# Patient Record
Sex: MALE | Race: WHITE | NOT HISPANIC OR LATINO | Employment: PART TIME | URBAN - METROPOLITAN AREA
[De-identification: names, ages, dates, MRNs, and addresses within clinical notes are randomized per-mention and may not be internally consistent; named-entity substitution may affect disease eponyms.]

---

## 2017-01-30 ENCOUNTER — GENERIC CONVERSION - ENCOUNTER (OUTPATIENT)
Dept: OTHER | Facility: OTHER | Age: 67
End: 2017-01-30

## 2017-02-20 ENCOUNTER — ALLSCRIPTS OFFICE VISIT (OUTPATIENT)
Dept: OTHER | Facility: OTHER | Age: 67
End: 2017-02-20

## 2017-02-20 LAB — OCCULT BLD, FECAL IMMUNOLOGICAL (HISTORICAL): NEGATIVE

## 2017-05-22 ENCOUNTER — ALLSCRIPTS OFFICE VISIT (OUTPATIENT)
Dept: OTHER | Facility: OTHER | Age: 67
End: 2017-05-22

## 2018-01-12 NOTE — MISCELLANEOUS
Message  I spoke topatient re labs sugar still inc lfts slight inc chol better will katelyn hga1c and lfts in 3 mons      Signatures   Electronically signed by : SIMEON Stevenson ; Jan 30 2017 11:01AM EST                       (Author)

## 2018-01-14 VITALS
OXYGEN SATURATION: 97 % | DIASTOLIC BLOOD PRESSURE: 78 MMHG | SYSTOLIC BLOOD PRESSURE: 110 MMHG | WEIGHT: 192 LBS | BODY MASS INDEX: 26.88 KG/M2 | HEART RATE: 64 BPM | RESPIRATION RATE: 18 BRPM | HEIGHT: 71 IN

## 2018-01-14 VITALS
SYSTOLIC BLOOD PRESSURE: 118 MMHG | RESPIRATION RATE: 16 BRPM | TEMPERATURE: 95.7 F | BODY MASS INDEX: 28 KG/M2 | HEIGHT: 71 IN | DIASTOLIC BLOOD PRESSURE: 74 MMHG | WEIGHT: 200 LBS | HEART RATE: 82 BPM

## 2018-02-08 ENCOUNTER — TELEPHONE (OUTPATIENT)
Dept: FAMILY MEDICINE CLINIC | Facility: CLINIC | Age: 68
End: 2018-02-08

## 2018-02-08 NOTE — TELEPHONE ENCOUNTER
Dr Lam Washington ON 2/26/18  HE WOULD LIKE BW ORDERS SO HE CAN HAVE BW DONE BEFORE APPT  CALL HIM WHEN READY

## 2018-02-16 ENCOUNTER — TELEPHONE (OUTPATIENT)
Dept: FAMILY MEDICINE CLINIC | Facility: CLINIC | Age: 68
End: 2018-02-16

## 2018-02-20 DIAGNOSIS — E11.9 TYPE 2 DIABETES MELLITUS WITHOUT COMPLICATION, WITHOUT LONG-TERM CURRENT USE OF INSULIN (HCC): Primary | ICD-10-CM

## 2018-02-21 DIAGNOSIS — I10 ESSENTIAL HYPERTENSION: Primary | ICD-10-CM

## 2018-02-22 ENCOUNTER — TRANSCRIBE ORDERS (OUTPATIENT)
Dept: ADMINISTRATIVE | Facility: HOSPITAL | Age: 68
End: 2018-02-22

## 2018-02-22 ENCOUNTER — APPOINTMENT (OUTPATIENT)
Dept: LAB | Facility: HOSPITAL | Age: 68
End: 2018-02-22
Attending: FAMILY MEDICINE
Payer: MEDICARE

## 2018-02-22 DIAGNOSIS — E11.9 TYPE 2 DIABETES MELLITUS WITHOUT COMPLICATION, WITHOUT LONG-TERM CURRENT USE OF INSULIN (HCC): ICD-10-CM

## 2018-02-22 LAB
EST. AVERAGE GLUCOSE BLD GHB EST-MCNC: 137 MG/DL
HBA1C MFR BLD: 6.4 % (ref 4.2–6.3)

## 2018-02-22 PROCEDURE — 83036 HEMOGLOBIN GLYCOSYLATED A1C: CPT

## 2018-02-22 PROCEDURE — 36415 COLL VENOUS BLD VENIPUNCTURE: CPT

## 2018-02-23 RX ORDER — LISINOPRIL 40 MG/1
TABLET ORAL
Qty: 90 TABLET | Refills: 3 | Status: SHIPPED | OUTPATIENT
Start: 2018-02-23 | End: 2018-03-09 | Stop reason: SDUPTHER

## 2018-02-26 ENCOUNTER — OFFICE VISIT (OUTPATIENT)
Dept: FAMILY MEDICINE CLINIC | Facility: CLINIC | Age: 68
End: 2018-02-26
Payer: MEDICARE

## 2018-02-26 VITALS
HEART RATE: 88 BPM | HEIGHT: 71 IN | DIASTOLIC BLOOD PRESSURE: 80 MMHG | RESPIRATION RATE: 18 BRPM | OXYGEN SATURATION: 96 % | TEMPERATURE: 97.4 F | SYSTOLIC BLOOD PRESSURE: 116 MMHG | WEIGHT: 209 LBS | BODY MASS INDEX: 29.26 KG/M2

## 2018-02-26 DIAGNOSIS — J43.8 OTHER EMPHYSEMA (HCC): Primary | ICD-10-CM

## 2018-02-26 DIAGNOSIS — Z00.00 HEALTHCARE MAINTENANCE: ICD-10-CM

## 2018-02-26 PROCEDURE — G0438 PPPS, INITIAL VISIT: HCPCS | Performed by: FAMILY MEDICINE

## 2018-02-26 RX ORDER — ALBUTEROL SULFATE 90 UG/1
2 AEROSOL, METERED RESPIRATORY (INHALATION) EVERY 6 HOURS PRN
Qty: 1 INHALER | Refills: 5 | Status: SHIPPED | OUTPATIENT
Start: 2018-02-26 | End: 2018-12-17 | Stop reason: SDUPTHER

## 2018-02-26 NOTE — PROGRESS NOTES
Patient seen for aww    Has hx  Restrictive lung dis   But Needs inhaler  So needs refill   Alhaving  Difficulty so having vivid   Dreams  Having some ibs   Sx  will  Need f/u  Has not seen gi or had a colonoscopy

## 2018-02-27 PROBLEM — N52.9 ERECTILE DYSFUNCTION: Status: ACTIVE | Noted: 2017-02-20

## 2018-02-27 PROBLEM — Z00.00 HEALTHCARE MAINTENANCE: Status: ACTIVE | Noted: 2018-02-27

## 2018-03-09 DIAGNOSIS — I10 ESSENTIAL HYPERTENSION: ICD-10-CM

## 2018-03-09 DIAGNOSIS — F41.9 ANXIETY: Primary | ICD-10-CM

## 2018-03-09 RX ORDER — LISINOPRIL 40 MG/1
TABLET ORAL
Qty: 90 TABLET | Refills: 3 | Status: SHIPPED | OUTPATIENT
Start: 2018-03-09 | End: 2018-03-10 | Stop reason: SDUPTHER

## 2018-03-09 RX ORDER — VENLAFAXINE 75 MG/1
TABLET ORAL
Qty: 180 TABLET | Refills: 3 | Status: SHIPPED | OUTPATIENT
Start: 2018-03-09 | End: 2018-03-10 | Stop reason: SDUPTHER

## 2018-03-10 DIAGNOSIS — I10 ESSENTIAL HYPERTENSION: ICD-10-CM

## 2018-03-10 DIAGNOSIS — F41.9 ANXIETY: ICD-10-CM

## 2018-03-12 RX ORDER — VENLAFAXINE 75 MG/1
TABLET ORAL
Qty: 180 TABLET | Refills: 3 | Status: SHIPPED | OUTPATIENT
Start: 2018-03-12 | End: 2019-12-03 | Stop reason: SDUPTHER

## 2018-03-12 RX ORDER — LISINOPRIL 40 MG/1
TABLET ORAL
Qty: 90 TABLET | Refills: 3 | Status: SHIPPED | OUTPATIENT
Start: 2018-03-12 | End: 2019-05-21 | Stop reason: SDUPTHER

## 2018-03-16 ENCOUNTER — OFFICE VISIT (OUTPATIENT)
Dept: PULMONOLOGY | Facility: MEDICAL CENTER | Age: 68
End: 2018-03-16
Payer: MEDICARE

## 2018-03-16 VITALS
OXYGEN SATURATION: 96 % | HEIGHT: 71 IN | DIASTOLIC BLOOD PRESSURE: 78 MMHG | SYSTOLIC BLOOD PRESSURE: 118 MMHG | TEMPERATURE: 98 F | HEART RATE: 88 BPM | RESPIRATION RATE: 18 BRPM | BODY MASS INDEX: 29.68 KG/M2 | WEIGHT: 212 LBS

## 2018-03-16 DIAGNOSIS — R53.82 CHRONIC FATIGUE: ICD-10-CM

## 2018-03-16 DIAGNOSIS — R06.02 SHORTNESS OF BREATH: Primary | ICD-10-CM

## 2018-03-16 DIAGNOSIS — J45.20 MILD INTERMITTENT ASTHMA WITHOUT COMPLICATION: ICD-10-CM

## 2018-03-16 DIAGNOSIS — J43.8 OTHER EMPHYSEMA (HCC): ICD-10-CM

## 2018-03-16 DIAGNOSIS — R06.83 SNORING: ICD-10-CM

## 2018-03-16 DIAGNOSIS — K21.9 GASTROESOPHAGEAL REFLUX DISEASE, ESOPHAGITIS PRESENCE NOT SPECIFIED: ICD-10-CM

## 2018-03-16 DIAGNOSIS — R09.82 ALLERGIC RHINITIS WITH POSTNASAL DRIP: ICD-10-CM

## 2018-03-16 DIAGNOSIS — J30.9 ALLERGIC RHINITIS WITH POSTNASAL DRIP: ICD-10-CM

## 2018-03-16 PROCEDURE — 99205 OFFICE O/P NEW HI 60 MIN: CPT | Performed by: INTERNAL MEDICINE

## 2018-03-16 PROCEDURE — 94010 BREATHING CAPACITY TEST: CPT | Performed by: INTERNAL MEDICINE

## 2018-03-16 RX ORDER — SILDENAFIL 50 MG/1
1 TABLET, FILM COATED ORAL
COMMUNITY
Start: 2017-02-20 | End: 2019-09-03 | Stop reason: ALTCHOICE

## 2018-03-16 RX ORDER — ALPRAZOLAM 0.25 MG/1
TABLET ORAL
COMMUNITY
Start: 2014-02-07 | End: 2019-12-03 | Stop reason: ALTCHOICE

## 2018-03-16 RX ORDER — OMEPRAZOLE 20 MG/1
20 CAPSULE, DELAYED RELEASE ORAL DAILY
Qty: 30 CAPSULE | Refills: 0 | Status: SHIPPED | OUTPATIENT
Start: 2018-03-16 | End: 2018-04-09

## 2018-03-16 RX ORDER — AZELASTINE 1 MG/ML
1 SPRAY, METERED NASAL 2 TIMES DAILY
Qty: 30 ML | Refills: 0 | Status: SHIPPED | OUTPATIENT
Start: 2018-03-16 | End: 2018-04-09

## 2018-03-16 NOTE — PATIENT INSTRUCTIONS
Daily inhaler is Breo- once a day  Rinse and spit after use  Astelin nasal spray, one spray each nostril twice daily  Omeprazole- GERD medication- 30 minutes before you eat anything in the morning     Diet and behavior modification for GERD

## 2018-03-16 NOTE — LETTER
March 18, 2018     [address]  [address]    Patient: Ronald Arreola   YOB: 1950   Date of Visit: 3/16/2018       Dear Dr Ferrel Boxer: Thank you for referring Tamera Hamm to me for evaluation  Below are my notes for this consultation  If you have questions, please do not hesitate to call me  I look forward to following your patient along with you  Sincerely,        Rajesh Morales MD        CC: No Recipients  Rajesh Morales MD  3/18/2018  6:09 PM  Sign at close encounter  Assessment/Plan:       Problem List Items Addressed This Visit        Digestive    Gastroesophageal reflux disease     Patient has significant GERD which may be contributing to underlying symptoms  He will use omeprazole  Also he has difficulty swallowing and choking on thin liquids and solids  This is concerning  Will treat for GERD and assess for symptom improvement  If his symptoms do not improve he may require gastroenterology and EGD/and or other imaging studies         Relevant Medications    omeprazole (PriLOSEC) 20 mg delayed release capsule       Respiratory    Allergic rhinitis with postnasal drip     Start Astelin nasal spray  Also a contributing factor to his underlying shortness of breath and wheezing  He has other allergies  He has seen an allergist in the past   Will need to get these records  Relevant Medications    azelastine (ASTELIN) 0 1 % nasal spray    Mild intermittent asthma without complication     Start Breo daily to help with ongoing symptoms of shortness of breath and wheezing  Obtain complete PFT with methacholine challenge  Assess flow volume loops carefully on this study  Relevant Orders    CBC and differential    Bronchial challenge with methacholine       Other    Fatigue     Patient has complaints of severe fatigue which may be related to obstructive sleep apnea however will obtain thyroid function studies    He will have his blood drawn after he sees his primary care physician who will order any other necessary blood work  This is explained to him  Relevant Orders    TSH, 3rd generation with T4 reflex    Shortness of breath - Primary    Relevant Orders    POCT spirometry (Completed)    Snoring     Compound it with his symptoms of fatigue this is suspicious for underlying obstructive sleep apnea  Patient's underlying risk factors are reviewed  Underlying pathophysiology and risks of untreated sleep apnea were discussed in detail  Diagnosis via sleep study is discussed in detail with the patient today  Treatment options including CPAP, surgical options, dental device or discussed in detail  He would like to hold off on sleep testing at this time despite recommendation  Relevant Orders    Diagnostic Sleep Study      Other Visit Diagnoses     Other emphysema ([facility] [address] )        Relevant Medications    azelastine (ASTELIN) 0 1 % nasal spray            Return in about 2 weeks (around 3/30/2018)  All questions are answered to the patient's satisfaction and understanding  He verbalizes understanding  He is encouraged to call with any further questions or concerns  Portions of the record may have been created with voice recognition software  Occasional wrong word or "sound a like" substitutions may have occurred due to the inherent limitations of voice recognition software  Read the chart carefully and recognize, using context, where substitutions have occurred  ______________________________________________________________________    Chief Complaint:   Chief Complaint   Patient presents with    Shortness of Breath     Stairs,exertion, weather  Referred by Dr Marquise Abreu Cough     occasionally, loose productive clear  Patient ID: Alejandro White is a 76 y o  y o  male has a past medical history of Arthritis; Asthma; COPD (chronic obstructive pulmonary disease) ([facility] [address] ); Depression;  Hiatal hernia; Hyperlipidemia; Hypertension; Psychiatric disorder; Sinus trouble; and SOB (shortness of breath) on exertion  3/16/2018  Patient presents today for initial visit for evaluation of extreme daytime fatigue and snoring as well as shortness of breath and wheezing on exertion  Shortness of breath on exertion and wheezing  Weather related- humid  This has been going on for 25 year ago  Symptoms have not been getting worse but not getting better  +GERD- takes OTC medications- Tums, bharath seltzer  This is triggered by food  Has allergies to everything based on allergy testing by ENT/Allergist    Does choke a lot on thin liquids on solids  This has been going on for 5-6 years this is worse over the last 1 5-2 yrs  This is getting worse  Had an inhaler about 7-8 years ago  This was given to him due to bronchitis  Ventolin  He was on a pill form- ?Singulair  And he felt that this did not help him  Using Ventolin- 1-2 times per week  No nighttime awakenings to use it  No breathing trouble in his sleep  He does some difficulty when he wakes up because he has dry mouth quite severely  Slight cough some days  When he has a cold it always goes into his chest    This happens about 1-2 times per year  +post nasal drip- often- does not use any nasal spray  He does take Venlafaxine- he reviewed the side effects- has helped with mood swings  Dosing has been decreased- due to side effects  Snoring, vivid dreams  Extreme daytime fatigue  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    Travel history: no recent travel    Review of Systems   All other systems reviewed and are negative  Social history: He reports that he has never smoked  He has never used smokeless tobacco  He reports that he drinks alcohol  He reports that he does not use drugs      Past surgical history:   Past Surgical History:   Procedure Laterality Date    HERNIA REPAIR       Family history:   Family History   Problem Relation Age of Onset    Hypertension Father     Skin cancer Sister     Skin cancer Brother     Arthritis Family        Immunization History   Administered Date(s) Administered    Influenza Quadrivalent Preservative Free 3 years and older IM 10/20/2015, 11/02/2016    Influenza TIV (IM) 10/23/2007, 09/23/2011, 10/05/2014    Pneumococcal Conjugate 13-Valent 02/22/2016    Pneumococcal Polysaccharide PPV23 02/20/2017    Tdap 01/30/2007, 02/20/2017    Zoster 11/05/2014     Current Outpatient Prescriptions   Medication Sig Dispense Refill    albuterol (VENTOLIN HFA) 90 mcg/act inhaler Inhale 2 puffs every 6 (six) hours as needed for wheezing 1 Inhaler 5    amLODIPine (NORVASC) 10 mg tablet Take 5 mg by mouth daily        ibuprofen (MOTRIN) 800 mg tablet Take 1 tablet (800 mg total) by mouth every 8 (eight) hours as needed for mild pain for up to 30 doses  30 tablet 0    lisinopril (ZESTRIL) 40 mg tablet TAKE ONE TABLET BY MOUTH EVERY DAY 90 tablet 3    venlafaxine (EFFEXOR) 75 mg tablet TAKE ONE TABLET BY MOUTH TWICE A DAY WITH A MEAL (Patient taking differently: TAKE ONE TABLET BY MOUTH DAILY) 180 tablet 3    ALPRAZolam (XANAX) 0 25 mg tablet Take by mouth      ondansetron (ZOFRAN) 4 mg tablet Take 1 tablet (4 mg total) by mouth every 8 (eight) hours as needed for nausea or vomiting for up to 20 doses  20 tablet 0    oxyCODONE-acetaminophen (PERCOCET) 5-325 mg per tablet Take 1 tablet by mouth every 4 (four) hours as needed for moderate pain for up to 15 doses  Max Daily Amount: 6 tablets 15 tablet 0    sildenafil (VIAGRA) 50 MG tablet Take 1 tablet by mouth      venlafaxine (EFFEXOR-XR) 150 mg 24 hr capsule Take 150 mg by mouth daily  No current facility-administered medications for this visit  Allergies: Dust mite extract;  Other; and Oxycodone    Objective:  Vitals:    03/16/18 1009   BP: 118/78   BP Location: Right arm   Patient Position: Sitting Cuff Size: Standard   Pulse: 88   Resp: 18   Temp: 98 °F (36 7 °C)   TempSrc: Oral   SpO2: 96%   Weight: 96 2 kg (212 lb)   Height: 5' 11" (1 803 m)   Oxygen Therapy  SpO2: 96 %    Wt Readings from Last 3 Encounters:   03/16/18 96 2 kg (212 lb)   02/26/18 94 8 kg (209 lb)   05/22/17 87 1 kg (192 lb)     Body mass index is 29 57 kg/m²  Physical Exam   Constitutional: He is oriented to person, place, and time  He appears well-developed and well-nourished  No distress  HENT:   Head: Normocephalic and atraumatic  Mouth/Throat: Oropharynx is clear and moist  No oropharyngeal exudate  Mallampati 4  Erythema   Eyes: EOM are normal  Pupils are equal, round, and reactive to light  Neck: Normal range of motion  Neck supple  No tracheal deviation present  No thyromegaly present  Neck circumference 16 5   Cardiovascular: Normal rate and regular rhythm  No murmur heard  Pulmonary/Chest: Effort normal and breath sounds normal  No respiratory distress  He has no wheezes  He has no rales  He exhibits no tenderness  Abdominal: Soft  Bowel sounds are normal  He exhibits no distension  There is no tenderness  Musculoskeletal: Normal range of motion  He exhibits no edema  Lymphadenopathy:     He has no cervical adenopathy  Neurological: He is alert and oriented to person, place, and time  No cranial nerve deficit  Skin: Skin is warm and dry  He is not diaphoretic  Psychiatric: He has a normal mood and affect  His behavior is normal    Vitals reviewed  Lab Review:   Appointment on 02/22/2018   Component Date Value    Hemoglobin A1C 02/22/2018 6 4*    EAG 02/22/2018 137        Diagnostics:    Office Spirometry Results:  Spirometry performed at today's visit showed normal obstructive ratio and normal FVC and FEV1  However there is some flattening of the inspiratory flow volume loop which may be consistent with extrathoracic obstruction

## 2018-03-16 NOTE — PROGRESS NOTES
Assessment/Plan:       Problem List Items Addressed This Visit        Digestive    Gastroesophageal reflux disease     Patient has significant GERD which may be contributing to underlying symptoms  He will use omeprazole  Also he has difficulty swallowing and choking on thin liquids and solids  This is concerning  Will treat for GERD and assess for symptom improvement  If his symptoms do not improve he may require gastroenterology and EGD/and or other imaging studies         Relevant Medications    omeprazole (PriLOSEC) 20 mg delayed release capsule       Respiratory    Allergic rhinitis with postnasal drip     Start Astelin nasal spray  Also a contributing factor to his underlying shortness of breath and wheezing  He has other allergies  He has seen an allergist in the past   Will need to get these records  Relevant Medications    azelastine (ASTELIN) 0 1 % nasal spray    Mild intermittent asthma without complication     Start Breo daily to help with ongoing symptoms of shortness of breath and wheezing  Obtain complete PFT with methacholine challenge  Assess flow volume loops carefully on this study  Relevant Orders    CBC and differential    Bronchial challenge with methacholine       Other    Fatigue     Patient has complaints of severe fatigue which may be related to obstructive sleep apnea however will obtain thyroid function studies  He will have his blood drawn after he sees his primary care physician who will order any other necessary blood work  This is explained to him  Relevant Orders    TSH, 3rd generation with T4 reflex    Shortness of breath - Primary    Relevant Orders    POCT spirometry (Completed)    Snoring     Compound it with his symptoms of fatigue this is suspicious for underlying obstructive sleep apnea  Patient's underlying risk factors are reviewed  Underlying pathophysiology and risks of untreated sleep apnea were discussed in detail    Diagnosis via sleep study is discussed in detail with the patient today  Treatment options including CPAP, surgical options, dental device or discussed in detail  He would like to hold off on sleep testing at this time despite recommendation  Relevant Orders    Diagnostic Sleep Study      Other Visit Diagnoses     Other emphysema ([facility] [address] )        Relevant Medications    azelastine (ASTELIN) 0 1 % nasal spray            Return in about 2 weeks (around 3/30/2018)  All questions are answered to the patient's satisfaction and understanding  He verbalizes understanding  He is encouraged to call with any further questions or concerns  Portions of the record may have been created with voice recognition software  Occasional wrong word or "sound a like" substitutions may have occurred due to the inherent limitations of voice recognition software  Read the chart carefully and recognize, using context, where substitutions have occurred  ______________________________________________________________________    Chief Complaint:   Chief Complaint   Patient presents with    Shortness of Breath     Stairs,exertion, weather  Referred by Dr Tyree Camacho Cough     occasionally, loose productive clear  Patient ID: Mely Aparicio is a 76 y o  y o  male has a past medical history of Arthritis; Asthma; COPD (chronic obstructive pulmonary disease) ([facility] [address] ); Depression; Hiatal hernia; Hyperlipidemia; Hypertension; Psychiatric disorder; Sinus trouble; and SOB (shortness of breath) on exertion  3/16/2018  Patient presents today for initial visit for evaluation of extreme daytime fatigue and snoring as well as shortness of breath and wheezing on exertion  Shortness of breath on exertion and wheezing  Weather related- humid  This has been going on for 25 year ago  Symptoms have not been getting worse but not getting better  +GERD- takes OTC medications- Tums, bharath seltzer  This is triggered by food      Has allergies to everything based on allergy testing by ENT/Allergist    Does choke a lot on thin liquids on solids  This has been going on for 5-6 years this is worse over the last 1 5-2 yrs  This is getting worse  Had an inhaler about 7-8 years ago  This was given to him due to bronchitis  Ventolin  He was on a pill form- ?Singulair  And he felt that this did not help him  Using Ventolin- 1-2 times per week  No nighttime awakenings to use it  No breathing trouble in his sleep  He does some difficulty when he wakes up because he has dry mouth quite severely  Slight cough some days  When he has a cold it always goes into his chest    This happens about 1-2 times per year  +post nasal drip- often- does not use any nasal spray  He does take Venlafaxine- he reviewed the side effects- has helped with mood swings  Dosing has been decreased- due to side effects  Snoring, vivid dreams  Extreme daytime fatigue  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    Travel history: no recent travel    Review of Systems   All other systems reviewed and are negative  Social history: He reports that he has never smoked  He has never used smokeless tobacco  He reports that he drinks alcohol  He reports that he does not use drugs      Past surgical history:   Past Surgical History:   Procedure Laterality Date    HERNIA REPAIR       Family history:   Family History   Problem Relation Age of Onset    Hypertension Father     Skin cancer Sister     Skin cancer Brother     Arthritis Family        Immunization History   Administered Date(s) Administered    Influenza Quadrivalent Preservative Free 3 years and older IM 10/20/2015, 11/02/2016    Influenza TIV (IM) 10/23/2007, 09/23/2011, 10/05/2014    Pneumococcal Conjugate 13-Valent 02/22/2016    Pneumococcal Polysaccharide PPV23 02/20/2017    Tdap 01/30/2007, 02/20/2017    Zoster 11/05/2014     Current Outpatient Prescriptions   Medication Sig Dispense Refill    albuterol (VENTOLIN HFA) 90 mcg/act inhaler Inhale 2 puffs every 6 (six) hours as needed for wheezing 1 Inhaler 5    amLODIPine (NORVASC) 10 mg tablet Take 5 mg by mouth daily        ibuprofen (MOTRIN) 800 mg tablet Take 1 tablet (800 mg total) by mouth every 8 (eight) hours as needed for mild pain for up to 30 doses  30 tablet 0    lisinopril (ZESTRIL) 40 mg tablet TAKE ONE TABLET BY MOUTH EVERY DAY 90 tablet 3    venlafaxine (EFFEXOR) 75 mg tablet TAKE ONE TABLET BY MOUTH TWICE A DAY WITH A MEAL (Patient taking differently: TAKE ONE TABLET BY MOUTH DAILY) 180 tablet 3    ALPRAZolam (XANAX) 0 25 mg tablet Take by mouth      ondansetron (ZOFRAN) 4 mg tablet Take 1 tablet (4 mg total) by mouth every 8 (eight) hours as needed for nausea or vomiting for up to 20 doses  20 tablet 0    oxyCODONE-acetaminophen (PERCOCET) 5-325 mg per tablet Take 1 tablet by mouth every 4 (four) hours as needed for moderate pain for up to 15 doses  Max Daily Amount: 6 tablets 15 tablet 0    sildenafil (VIAGRA) 50 MG tablet Take 1 tablet by mouth      venlafaxine (EFFEXOR-XR) 150 mg 24 hr capsule Take 150 mg by mouth daily  No current facility-administered medications for this visit  Allergies: Dust mite extract; Other; and Oxycodone    Objective:  Vitals:    03/16/18 1009   BP: 118/78   BP Location: Right arm   Patient Position: Sitting   Cuff Size: Standard   Pulse: 88   Resp: 18   Temp: 98 °F (36 7 °C)   TempSrc: Oral   SpO2: 96%   Weight: 96 2 kg (212 lb)   Height: 5' 11" (1 803 m)   Oxygen Therapy  SpO2: 96 %    Wt Readings from Last 3 Encounters:   03/16/18 96 2 kg (212 lb)   02/26/18 94 8 kg (209 lb)   05/22/17 87 1 kg (192 lb)     Body mass index is 29 57 kg/m²  Physical Exam   Constitutional: He is oriented to person, place, and time  He appears well-developed and well-nourished  No distress  HENT:   Head: Normocephalic and atraumatic     Mouth/Throat: Oropharynx is clear and moist  No oropharyngeal exudate  Mallampati 4  Erythema   Eyes: EOM are normal  Pupils are equal, round, and reactive to light  Neck: Normal range of motion  Neck supple  No tracheal deviation present  No thyromegaly present  Neck circumference 16 5   Cardiovascular: Normal rate and regular rhythm  No murmur heard  Pulmonary/Chest: Effort normal and breath sounds normal  No respiratory distress  He has no wheezes  He has no rales  He exhibits no tenderness  Abdominal: Soft  Bowel sounds are normal  He exhibits no distension  There is no tenderness  Musculoskeletal: Normal range of motion  He exhibits no edema  Lymphadenopathy:     He has no cervical adenopathy  Neurological: He is alert and oriented to person, place, and time  No cranial nerve deficit  Skin: Skin is warm and dry  He is not diaphoretic  Psychiatric: He has a normal mood and affect  His behavior is normal    Vitals reviewed  Lab Review:   Appointment on 02/22/2018   Component Date Value    Hemoglobin A1C 02/22/2018 6 4*    EAG 02/22/2018 137        Diagnostics:    Office Spirometry Results:  Spirometry performed at today's visit showed normal obstructive ratio and normal FVC and FEV1  However there is some flattening of the inspiratory flow volume loop which may be consistent with extrathoracic obstruction

## 2018-03-18 PROBLEM — J45.20 MILD INTERMITTENT ASTHMA WITHOUT COMPLICATION: Status: ACTIVE | Noted: 2018-03-18

## 2018-03-18 PROBLEM — R06.02 SHORTNESS OF BREATH: Status: ACTIVE | Noted: 2018-03-18

## 2018-03-18 PROBLEM — J30.9 ALLERGIC RHINITIS WITH POSTNASAL DRIP: Status: ACTIVE | Noted: 2018-03-18

## 2018-03-18 PROBLEM — R06.83 SNORING: Status: ACTIVE | Noted: 2018-03-18

## 2018-03-18 PROBLEM — R09.82 ALLERGIC RHINITIS WITH POSTNASAL DRIP: Status: ACTIVE | Noted: 2018-03-18

## 2018-03-18 PROBLEM — K21.9 GASTROESOPHAGEAL REFLUX DISEASE: Status: ACTIVE | Noted: 2018-03-18

## 2018-03-18 NOTE — ASSESSMENT & PLAN NOTE
Start Astelin nasal spray  Also a contributing factor to his underlying shortness of breath and wheezing  He has other allergies  He has seen an allergist in the past   Will need to get these records

## 2018-03-18 NOTE — ASSESSMENT & PLAN NOTE
Compound it with his symptoms of fatigue this is suspicious for underlying obstructive sleep apnea  Patient's underlying risk factors are reviewed  Underlying pathophysiology and risks of untreated sleep apnea were discussed in detail  Diagnosis via sleep study is discussed in detail with the patient today  Treatment options including CPAP, surgical options, dental device or discussed in detail  He would like to hold off on sleep testing at this time despite recommendation

## 2018-03-18 NOTE — ASSESSMENT & PLAN NOTE
Start Breo daily to help with ongoing symptoms of shortness of breath and wheezing  Obtain complete PFT with methacholine challenge  Assess flow volume loops carefully on this study

## 2018-03-18 NOTE — ASSESSMENT & PLAN NOTE
Patient has significant GERD which may be contributing to underlying symptoms  He will use omeprazole  Also he has difficulty swallowing and choking on thin liquids and solids  This is concerning  Will treat for GERD and assess for symptom improvement    If his symptoms do not improve he may require gastroenterology and EGD/and or other imaging studies

## 2018-03-18 NOTE — ASSESSMENT & PLAN NOTE
Patient has complaints of severe fatigue which may be related to obstructive sleep apnea however will obtain thyroid function studies  He will have his blood drawn after he sees his primary care physician who will order any other necessary blood work  This is explained to him

## 2018-03-27 ENCOUNTER — APPOINTMENT (OUTPATIENT)
Dept: LAB | Facility: HOSPITAL | Age: 68
End: 2018-03-27
Attending: INTERNAL MEDICINE
Payer: MEDICARE

## 2018-03-27 ENCOUNTER — HOSPITAL ENCOUNTER (OUTPATIENT)
Dept: PULMONOLOGY | Facility: HOSPITAL | Age: 68
Discharge: HOME | End: 2018-03-27
Attending: INTERNAL MEDICINE
Payer: MEDICARE

## 2018-03-27 ENCOUNTER — TRANSCRIBE ORDERS (OUTPATIENT)
Dept: ADMINISTRATIVE | Facility: HOSPITAL | Age: 68
End: 2018-03-27

## 2018-03-27 DIAGNOSIS — J45.20 MILD INTERMITTENT ASTHMA WITHOUT COMPLICATION: ICD-10-CM

## 2018-03-27 DIAGNOSIS — R53.82 CHRONIC FATIGUE: ICD-10-CM

## 2018-03-27 LAB
BASOPHILS # BLD AUTO: 0.1 THOUSANDS/ΜL (ref 0–0.1)
BASOPHILS NFR BLD AUTO: 1 % (ref 0–1)
EOSINOPHIL # BLD AUTO: 0.1 THOUSAND/ΜL (ref 0–0.61)
EOSINOPHIL NFR BLD AUTO: 1 % (ref 0–6)
ERYTHROCYTE [DISTWIDTH] IN BLOOD BY AUTOMATED COUNT: 12.6 % (ref 11.6–15.1)
HCT VFR BLD AUTO: 48.4 % (ref 42–52)
HGB BLD-MCNC: 16 G/DL (ref 14–18)
LYMPHOCYTES # BLD AUTO: 1.5 THOUSANDS/ΜL (ref 0.6–4.47)
LYMPHOCYTES NFR BLD AUTO: 22 % (ref 14–44)
MCH RBC QN AUTO: 31.4 PG (ref 27–31)
MCHC RBC AUTO-ENTMCNC: 33 G/DL (ref 31.4–37.4)
MCV RBC AUTO: 95 FL (ref 82–98)
MONOCYTES # BLD AUTO: 0.8 THOUSAND/ΜL (ref 0.17–1.22)
MONOCYTES NFR BLD AUTO: 11 % (ref 4–12)
NEUTROPHILS # BLD AUTO: 4.6 THOUSANDS/ΜL (ref 1.85–7.62)
NEUTS SEG NFR BLD AUTO: 66 % (ref 43–75)
PLATELET # BLD AUTO: 351 THOUSANDS/UL (ref 130–400)
PMV BLD AUTO: 8.2 FL (ref 8.9–12.7)
RBC # BLD AUTO: 5.09 MILLION/UL (ref 4.7–6.1)
TSH SERPL DL<=0.05 MIU/L-ACNC: 2.3 UIU/ML (ref 0.36–3.74)
WBC # BLD AUTO: 7.1 THOUSAND/UL (ref 4.8–10.8)

## 2018-03-27 PROCEDURE — 94760 N-INVAS EAR/PLS OXIMETRY 1: CPT

## 2018-03-27 PROCEDURE — 36415 COLL VENOUS BLD VENIPUNCTURE: CPT

## 2018-03-27 PROCEDURE — 94070 EVALUATION OF WHEEZING: CPT

## 2018-03-27 PROCEDURE — 85025 COMPLETE CBC W/AUTO DIFF WBC: CPT

## 2018-03-27 PROCEDURE — 94729 DIFFUSING CAPACITY: CPT

## 2018-03-27 PROCEDURE — 95806 SLEEP STUDY UNATT&RESP EFFT: CPT | Performed by: INTERNAL MEDICINE

## 2018-03-27 PROCEDURE — 94726 PLETHYSMOGRAPHY LUNG VOLUMES: CPT

## 2018-03-27 PROCEDURE — 84443 ASSAY THYROID STIM HORMONE: CPT

## 2018-03-27 RX ORDER — ALBUTEROL SULFATE 2.5 MG/3ML
2.5 SOLUTION RESPIRATORY (INHALATION) ONCE AS NEEDED
Status: DISCONTINUED | OUTPATIENT
Start: 2018-03-27 | End: 2018-03-31 | Stop reason: HOSPADM

## 2018-03-27 RX ORDER — ALBUTEROL SULFATE 2.5 MG/3ML
2.5 SOLUTION RESPIRATORY (INHALATION) ONCE
Status: DISCONTINUED | OUTPATIENT
Start: 2018-03-27 | End: 2018-03-31 | Stop reason: HOSPADM

## 2018-04-02 ENCOUNTER — TELEPHONE (OUTPATIENT)
Dept: PULMONOLOGY | Facility: MEDICAL CENTER | Age: 68
End: 2018-04-02

## 2018-04-02 DIAGNOSIS — J44.9 COPD (CHRONIC OBSTRUCTIVE PULMONARY DISEASE) WITH CHRONIC BRONCHITIS (HCC): Primary | ICD-10-CM

## 2018-04-02 RX ORDER — FLUTICASONE FUROATE AND VILANTEROL 100; 25 UG/1; UG/1
1 POWDER RESPIRATORY (INHALATION) DAILY
Qty: 1 EACH | Refills: 0 | Status: SHIPPED | COMMUNITY
Start: 2018-04-02 | End: 2018-04-09

## 2018-04-02 RX ORDER — FLUTICASONE FUROATE AND VILANTEROL 100; 25 UG/1; UG/1
1 POWDER RESPIRATORY (INHALATION)
Qty: 1 EACH | Refills: 0 | Status: SHIPPED | OUTPATIENT
Start: 2018-04-02 | End: 2018-04-02 | Stop reason: SDUPTHER

## 2018-04-02 RX ORDER — FLUTICASONE FUROATE AND VILANTEROL 100; 25 UG/1; UG/1
1 POWDER RESPIRATORY (INHALATION)
Qty: 1 EACH | Refills: 0 | Status: SHIPPED | OUTPATIENT
Start: 2018-04-02 | End: 2018-04-09

## 2018-04-02 NOTE — TELEPHONE ENCOUNTER
The patient came into the office to get a sample of Breo-  He was seen 2 weeks ago and was given a sample, but does not have another one to last until he is seen again next week-    No rx was sent   Was just given a sample--   Can you authorize a sample be given so he can have the medication until he is seen again

## 2018-04-09 ENCOUNTER — OFFICE VISIT (OUTPATIENT)
Dept: PULMONOLOGY | Facility: MEDICAL CENTER | Age: 68
End: 2018-04-09
Payer: MEDICARE

## 2018-04-09 VITALS
SYSTOLIC BLOOD PRESSURE: 120 MMHG | HEIGHT: 71 IN | DIASTOLIC BLOOD PRESSURE: 84 MMHG | TEMPERATURE: 98.2 F | HEART RATE: 86 BPM | OXYGEN SATURATION: 96 % | RESPIRATION RATE: 12 BRPM | BODY MASS INDEX: 29.82 KG/M2 | WEIGHT: 213 LBS

## 2018-04-09 DIAGNOSIS — J44.9 COPD (CHRONIC OBSTRUCTIVE PULMONARY DISEASE) WITH CHRONIC BRONCHITIS (HCC): ICD-10-CM

## 2018-04-09 DIAGNOSIS — J45.20 MILD INTERMITTENT ASTHMA WITHOUT COMPLICATION: Primary | ICD-10-CM

## 2018-04-09 PROCEDURE — 99213 OFFICE O/P EST LOW 20 MIN: CPT | Performed by: INTERNAL MEDICINE

## 2018-04-09 RX ORDER — FLUTICASONE FUROATE AND VILANTEROL 100; 25 UG/1; UG/1
1 POWDER RESPIRATORY (INHALATION) DAILY
Qty: 1 EACH | Refills: 0 | Status: SHIPPED | OUTPATIENT
Start: 2018-04-09 | End: 2018-12-17

## 2018-04-09 RX ORDER — FLUTICASONE FUROATE AND VILANTEROL 100; 25 UG/1; UG/1
1 POWDER RESPIRATORY (INHALATION) DAILY
Qty: 1 EACH | Refills: 6 | Status: SHIPPED | OUTPATIENT
Start: 2018-04-09 | End: 2018-04-09 | Stop reason: SDUPTHER

## 2018-04-09 NOTE — PROGRESS NOTES
Assessment/Plan:     Problem List Items Addressed This Visit        Respiratory    Mild intermittent asthma without complication - Primary    Relevant Medications    fluticasone furoate-vilanterol (BREO ELLIPTA)      Other Visit Diagnoses     COPD (chronic obstructive pulmonary disease) with chronic bronchitis (HCC)        Relevant Medications    fluticasone furoate-vilanterol (BREO ELLIPTA)          Patient is overall improved  No evidence of COPD  Continue with Breo  Return in about 2 months (around 6/9/2018)  All questions are answered to the patient's satisfaction and understanding  He verbalizes understanding  He is encouraged to call with any further questions or concerns  Portions of the record may have been created with voice recognition software  Occasional wrong word or "sound a like" substitutions may have occurred due to the inherent limitations of voice recognition software  Read the chart carefully and recognize, using context, where substitutions have occurred  ______________________________________________________________________    Chief Complaint: No chief complaint on file  Patient ID: Luciana Flores is a 76 y o  y o  male has a past medical history of Arthritis; Asthma; COPD (chronic obstructive pulmonary disease) ([address] ); Depression; Hiatal hernia; Hyperlipidemia; Hypertension; Psychiatric disorder; Sinus trouble; and SOB (shortness of breath) on exertion  4/9/2018  Breo is really helping him  Feels better  No further choking on food  GERD is improved with diet modification    Fatigue is still there  But this is also improved  No further postnasal drip per congestion  Review of Systems   All other systems reviewed and are negative  Smoking history: He reports that he has never smoked   He has never used smokeless tobacco     The following portions of the patient's history were reviewed and updated as appropriate: allergies, current medications, past family history, past medical history, past social history, past surgical history and problem list     Immunization History   Administered Date(s) Administered    Influenza Quadrivalent Preservative Free 3 years and older IM 10/20/2015, 11/02/2016    Influenza TIV (IM) 10/23/2007, 09/23/2011, 10/05/2014    Pneumococcal Conjugate 13-Valent 02/22/2016    Pneumococcal Polysaccharide PPV23 02/20/2017    Tdap 01/30/2007, 02/20/2017    Zoster 11/05/2014     Current Outpatient Prescriptions   Medication Sig Dispense Refill    albuterol (VENTOLIN HFA) 90 mcg/act inhaler Inhale 2 puffs every 6 (six) hours as needed for wheezing 1 Inhaler 5    ALPRAZolam (XANAX) 0 25 mg tablet Take by mouth      amLODIPine (NORVASC) 10 mg tablet Take 5 mg by mouth daily        azelastine (ASTELIN) 0 1 % nasal spray 1 spray into each nostril 2 (two) times a day Use in each nostril as directed 30 mL 0    fluticasone furoate-vilanterol (BREO ELLIPTA) Inhale 1 puff daily 1 each 0    ibuprofen (MOTRIN) 800 mg tablet Take 1 tablet (800 mg total) by mouth every 8 (eight) hours as needed for mild pain for up to 30 doses  30 tablet 0    lisinopril (ZESTRIL) 40 mg tablet TAKE ONE TABLET BY MOUTH EVERY DAY 90 tablet 3    omeprazole (PriLOSEC) 20 mg delayed release capsule Take 1 capsule (20 mg total) by mouth daily 30 capsule 0    venlafaxine (EFFEXOR) 75 mg tablet TAKE ONE TABLET BY MOUTH TWICE A DAY WITH A MEAL (Patient taking differently: TAKE ONE TABLET BY MOUTH DAILY) 180 tablet 3    fluticasone furoate-vilanterol (BREO ELLIPTA) Inhale 1 puff Daily at 2am 1 each 0    ondansetron (ZOFRAN) 4 mg tablet Take 1 tablet (4 mg total) by mouth every 8 (eight) hours as needed for nausea or vomiting for up to 20 doses  20 tablet 0    oxyCODONE-acetaminophen (PERCOCET) 5-325 mg per tablet Take 1 tablet by mouth every 4 (four) hours as needed for moderate pain for up to 15 doses   Max Daily Amount: 6 tablets 15 tablet 0    sildenafil (VIAGRA) 50 MG tablet Take 1 tablet by mouth      venlafaxine (EFFEXOR-XR) 150 mg 24 hr capsule Take 150 mg by mouth daily  No current facility-administered medications for this visit  Allergies: Dust mite extract; Other; and Oxycodone    Objective:  Vitals:    04/09/18 1404   BP: 120/84   BP Location: Right arm   Patient Position: Sitting   Cuff Size: Adult   Pulse: 86   Resp: 12   Temp: 98 2 °F (36 8 °C)   TempSrc: Oral   SpO2: 96%   Weight: 96 6 kg (213 lb)   Height: 5' 11" (1 803 m)   Oxygen Therapy  SpO2: 96 %    Wt Readings from Last 3 Encounters:   04/09/18 96 6 kg (213 lb)   03/16/18 96 2 kg (212 lb)   02/26/18 94 8 kg (209 lb)     Body mass index is 29 71 kg/m²  Physical Exam   Constitutional: He is oriented to person, place, and time  He appears well-developed and well-nourished  No distress  HENT:   Head: Normocephalic and atraumatic  Mouth/Throat: Oropharynx is clear and moist  No oropharyngeal exudate  Eyes: EOM are normal  Pupils are equal, round, and reactive to light  Neck: Normal range of motion  Neck supple  No tracheal deviation present  No thyromegaly present  Cardiovascular: Normal rate and regular rhythm  No murmur heard  Pulmonary/Chest: Effort normal and breath sounds normal  No respiratory distress  He has no wheezes  He has no rales  He exhibits no tenderness  Abdominal: Soft  Bowel sounds are normal  He exhibits no distension  There is no tenderness  Musculoskeletal: Normal range of motion  He exhibits no edema  Lymphadenopathy:     He has no cervical adenopathy  Neurological: He is alert and oriented to person, place, and time  No cranial nerve deficit  Skin: Skin is warm and dry  He is not diaphoretic  Psychiatric: He has a normal mood and affect  His behavior is normal    Vitals reviewed        Lab Review:   Appointment on 03/27/2018   Component Date Value    WBC 03/27/2018 7 10     RBC 03/27/2018 5 09     Hemoglobin 03/27/2018 16 0     Hematocrit 03/27/2018 48 4     MCV 03/27/2018 95     MCH 03/27/2018 31 4*    MCHC 03/27/2018 33 0     RDW 03/27/2018 12 6     MPV 03/27/2018 8 2*    Platelets 24/49/9728 351     Neutrophils Relative 03/27/2018 66     Lymphocytes Relative 03/27/2018 22     Monocytes Relative 03/27/2018 11     Eosinophils Relative 03/27/2018 1     Basophils Relative 03/27/2018 1     Neutrophils Absolute 03/27/2018 4 60     Lymphocytes Absolute 03/27/2018 1 50     Monocytes Absolute 03/27/2018 0 80     Eosinophils Absolute 03/27/2018 0 10     Basophils Absolute 03/27/2018 0 10     TSH 3RD GENERATON 03/27/2018 2 300    Appointment on 02/22/2018   Component Date Value    Hemoglobin A1C 02/22/2018 6 4*    EAG 02/22/2018 137        Diagnostics:  Complete PFT performed recently showed methacholine challenge was negative

## 2018-05-21 DIAGNOSIS — I10 ESSENTIAL HYPERTENSION: Primary | ICD-10-CM

## 2018-05-21 RX ORDER — AMLODIPINE BESYLATE 5 MG/1
TABLET ORAL
Qty: 90 TABLET | Refills: 3 | Status: SHIPPED | OUTPATIENT
Start: 2018-05-21 | End: 2019-04-30 | Stop reason: SDUPTHER

## 2018-07-17 ENCOUNTER — OFFICE VISIT (OUTPATIENT)
Dept: FAMILY MEDICINE CLINIC | Facility: CLINIC | Age: 68
End: 2018-07-17
Payer: MEDICARE

## 2018-07-17 VITALS
HEART RATE: 92 BPM | HEIGHT: 71 IN | OXYGEN SATURATION: 95 % | SYSTOLIC BLOOD PRESSURE: 102 MMHG | BODY MASS INDEX: 27.44 KG/M2 | TEMPERATURE: 98 F | DIASTOLIC BLOOD PRESSURE: 70 MMHG | WEIGHT: 196 LBS

## 2018-07-17 DIAGNOSIS — L25.9 CONTACT DERMATITIS, UNSPECIFIED CONTACT DERMATITIS TYPE, UNSPECIFIED TRIGGER: Primary | ICD-10-CM

## 2018-07-17 PROCEDURE — 99213 OFFICE O/P EST LOW 20 MIN: CPT | Performed by: NURSE PRACTITIONER

## 2018-07-17 RX ORDER — PREDNISONE 10 MG/1
10 TABLET ORAL DAILY
Qty: 15 TABLET | Refills: 0 | Status: SHIPPED | OUTPATIENT
Start: 2018-07-17 | End: 2018-12-17

## 2018-07-17 NOTE — PROGRESS NOTES
Assessment/Plan:  1  avoid sun during rash exacerbation  2  F/u if condition changes/owrsens         Diagnoses and all orders for this visit:    Contact dermatitis, unspecified contact dermatitis type, unspecified trigger  -     predniSONE 10 mg tablet; Take 1 tablet (10 mg total) by mouth daily Take 5 tabs day 1, take 4 tabs day 2, take 3 tabs day 3, take 2 tabs day 4, take 1 tab day 5          Subjective:      Patient ID: Johnathan Daly is a 76 y o  male  69-year-old male presents with rash for about a week  Reports he was gardening and his rash  Couple days later  Tried some hydrocortisone  Helped a little  Itchy  Denies fever  Not spreading  The following portions of the patient's history were reviewed and updated as appropriate: allergies and current medications  Review of Systems   Constitutional: Negative  Skin: Positive for rash  Objective:      /70   Pulse 92   Temp 98 °F (36 7 °C)   Ht 5' 11" (1 803 m)   Wt 88 9 kg (196 lb)   SpO2 95%   BMI 27 34 kg/m²          Physical Exam   Constitutional: He appears well-developed and well-nourished     Skin:   Contact dermatitis left arm approx 7cm X 3cm and left abdomen approc [id number]

## 2018-07-20 ENCOUNTER — OFFICE VISIT (OUTPATIENT)
Dept: FAMILY MEDICINE CLINIC | Facility: CLINIC | Age: 68
End: 2018-07-20
Payer: MEDICARE

## 2018-07-20 VITALS
HEIGHT: 71 IN | BODY MASS INDEX: 27.76 KG/M2 | DIASTOLIC BLOOD PRESSURE: 74 MMHG | HEART RATE: 90 BPM | TEMPERATURE: 98.1 F | RESPIRATION RATE: 18 BRPM | OXYGEN SATURATION: 95 % | SYSTOLIC BLOOD PRESSURE: 114 MMHG | WEIGHT: 198.31 LBS

## 2018-07-20 DIAGNOSIS — L25.9 CONTACT DERMATITIS, UNSPECIFIED CONTACT DERMATITIS TYPE, UNSPECIFIED TRIGGER: Primary | ICD-10-CM

## 2018-07-20 PROCEDURE — 99213 OFFICE O/P EST LOW 20 MIN: CPT | Performed by: NURSE PRACTITIONER

## 2018-07-20 RX ORDER — PREDNISONE 10 MG/1
10 TABLET ORAL DAILY
Qty: 15 TABLET | Refills: 0 | Status: SHIPPED | OUTPATIENT
Start: 2018-07-20 | End: 2018-12-17

## 2018-07-20 NOTE — PROGRESS NOTES
Assessment/Plan:  1  Protect the irritated area from the sun  2  Take medication as prescribed  3  Follow-up condition changes or worsens        Diagnoses and all orders for this visit:    Contact dermatitis, unspecified contact dermatitis type, unspecified trigger  -     predniSONE 10 mg tablet; Take 1 tablet (10 mg total) by mouth daily Take 5 tabs day 1, take 4 tabs day 2, take 3 tabs day 3, take 4 tabs day 4, take 1 tab day 5          Subjective:      Patient ID: Josefina Bills is a 76 y o  male  69-year-old male presents with rash on bilateral arms  Was seen on Tuesday and prescribed a steroid  Reports rash is getting better however it still itchy  Reports that he usually has to get it treated 2 times with oral steroid in order for the rash to go away  Reports he was gardening and came with contact with the plant  Denies fever  The following portions of the patient's history were reviewed and updated as appropriate: allergies and current medications  Review of Systems   Constitutional: Negative  Skin: Positive for rash  Objective:      /74 (BP Location: Left arm, Patient Position: Sitting)   Pulse 90   Temp 98 1 °F (36 7 °C) (Tympanic)   Resp 18   Ht 5' 11" (1 803 m)   Wt 90 kg (198 lb 5 oz)   SpO2 95%   BMI 27 66 kg/m²          Physical Exam   Constitutional: He appears well-developed and well-nourished     Skin:   Contact dermatitis bilateral arms/right arm approximately 3 centimeters x 3 centimeters and left arm approximately 2 centimeters x 1 centimeter

## 2018-08-03 DIAGNOSIS — K21.9 GASTROESOPHAGEAL REFLUX DISEASE, ESOPHAGITIS PRESENCE NOT SPECIFIED: ICD-10-CM

## 2018-08-03 RX ORDER — OMEPRAZOLE 20 MG/1
CAPSULE, DELAYED RELEASE ORAL
Qty: 30 CAPSULE | Refills: 0 | Status: SHIPPED | OUTPATIENT
Start: 2018-08-03 | End: 2018-12-17

## 2018-09-26 ENCOUNTER — TELEPHONE (OUTPATIENT)
Dept: FAMILY MEDICINE CLINIC | Facility: CLINIC | Age: 68
End: 2018-09-26

## 2018-09-26 NOTE — TELEPHONE ENCOUNTER
[facility] - PT HAS APPT WITH YOU IN October  HE WOULD LIKE BW ORDERS SO HE CAN HAVE BW DONE BEFORE THAT APPT  CALL  HIM WHEN READY  HE WOULD LIKE TO HAVE HIS PSA DONE ALONG WITH NORMAL BW THAT HE USUALLY HAS DONE

## 2018-10-01 ENCOUNTER — TELEPHONE (OUTPATIENT)
Dept: FAMILY MEDICINE CLINIC | Facility: CLINIC | Age: 68
End: 2018-10-01

## 2018-10-01 DIAGNOSIS — E78.2 MIXED HYPERLIPIDEMIA: ICD-10-CM

## 2018-10-01 DIAGNOSIS — R73.9 HYPERGLYCEMIA: ICD-10-CM

## 2018-10-01 DIAGNOSIS — Z12.5 ENCOUNTER FOR SCREENING FOR MALIGNANT NEOPLASM OF PROSTATE: ICD-10-CM

## 2018-10-01 DIAGNOSIS — I10 ESSENTIAL HYPERTENSION: Primary | ICD-10-CM

## 2018-10-02 LAB — HBA1C MFR BLD HPLC: 5.8 %

## 2018-10-04 LAB
ALBUMIN SERPL-MCNC: 4.6 G/DL (ref 3.6–4.8)
ALBUMIN/GLOB SERPL: 1.4 {RATIO} (ref 1.2–2.2)
ALP SERPL-CCNC: 77 IU/L (ref 39–117)
ALT SERPL-CCNC: 104 IU/L (ref 0–44)
AMBIG ABBREV DEFAULT: NORMAL
AST SERPL-CCNC: 79 IU/L (ref 0–40)
BILIRUB SERPL-MCNC: 1 MG/DL (ref 0–1.2)
BUN SERPL-MCNC: 15 MG/DL (ref 8–27)
BUN/CREAT SERPL: 14 (ref 10–24)
CALCIUM SERPL-MCNC: 9.9 MG/DL (ref 8.6–10.2)
CHLORIDE SERPL-SCNC: 100 MMOL/L (ref 96–106)
CHOLEST SERPL-MCNC: 196 MG/DL (ref 100–199)
CO2 SERPL-SCNC: 23 MMOL/L (ref 20–29)
CREAT SERPL-MCNC: 1.06 MG/DL (ref 0.76–1.27)
GLOBULIN SER-MCNC: 3.4 G/DL (ref 1.5–4.5)
GLUCOSE SERPL-MCNC: 113 MG/DL (ref 65–99)
HBA1C MFR BLD: 5.8 % (ref 4.8–5.6)
HDLC SERPL-MCNC: 46 MG/DL
HGB BLD-MCNC: 15 G/DL (ref 13–17.7)
LDLC SERPL CALC-MCNC: 119 MG/DL (ref 0–99)
POTASSIUM SERPL-SCNC: 4.3 MMOL/L (ref 3.5–5.2)
PROT SERPL-MCNC: 8 G/DL (ref 6–8.5)
PSA SERPL DL<=0.01 NG/ML-MCNC: 0.36 NG/ML (ref 0–4)
SL AMB EGFR AFRICAN AMERICAN: 83 ML/MIN/1.73
SL AMB EGFR NON AFRICAN AMERICAN: 72 ML/MIN/1.73
SODIUM SERPL-SCNC: 138 MMOL/L (ref 134–144)
TRIGL SERPL-MCNC: 155 MG/DL (ref 0–149)

## 2018-10-19 ENCOUNTER — OFFICE VISIT (OUTPATIENT)
Dept: FAMILY MEDICINE CLINIC | Facility: CLINIC | Age: 68
End: 2018-10-19
Payer: MEDICARE

## 2018-10-19 VITALS
OXYGEN SATURATION: 97 % | SYSTOLIC BLOOD PRESSURE: 122 MMHG | BODY MASS INDEX: 28.17 KG/M2 | RESPIRATION RATE: 18 BRPM | HEART RATE: 84 BPM | DIASTOLIC BLOOD PRESSURE: 74 MMHG | WEIGHT: 202 LBS

## 2018-10-19 DIAGNOSIS — J45.20 MILD INTERMITTENT ASTHMA WITHOUT COMPLICATION: ICD-10-CM

## 2018-10-19 DIAGNOSIS — R73.03 PREDIABETES: ICD-10-CM

## 2018-10-19 DIAGNOSIS — I10 BENIGN ESSENTIAL HYPERTENSION: Primary | ICD-10-CM

## 2018-10-19 DIAGNOSIS — M54.50 CHRONIC MIDLINE LOW BACK PAIN WITHOUT SCIATICA: ICD-10-CM

## 2018-10-19 DIAGNOSIS — R42 DIZZINESS: ICD-10-CM

## 2018-10-19 DIAGNOSIS — G89.29 CHRONIC MIDLINE LOW BACK PAIN WITHOUT SCIATICA: ICD-10-CM

## 2018-10-19 PROCEDURE — 99214 OFFICE O/P EST MOD 30 MIN: CPT | Performed by: FAMILY MEDICINE

## 2018-10-19 NOTE — PROGRESS NOTES
Assessment/Plan:  Will cont pres rx for htn hl and  Behavioral health   Monitor carbs  F/u labs  instructions       There are no diagnoses linked to this encounter  Subjective:      Patient ID: Dinorah Jordan is a 76 y o  male  Patient seen in f/u  Only occ beer  Does work physically doing  well on 76 of effexor  Min use of xanax   occ uses ventolin inhaler  Usually weather related dec amlodipine to 5   So dizziness has abated  Had stopped going to chiropracter  Back is better is taking  Some med for joint pain  Will be cutting back onwork in Jan Did see eye doc  Okay on meds did get flu vacc I did review labs        The following portions of the patient's history were reviewed and updated as appropriate: past family history, past medical history, past social history and past surgical history  Review of Systems   Constitutional: Negative  HENT: Negative  Eyes:        See hpi   Respiratory:        See hpi   Cardiovascular: Negative  Endocrine: Negative  Genitourinary: Negative  Musculoskeletal:        See hpi   Neurological:        See hpi   Psychiatric/Behavioral:        See hpi         Objective:      /74   Pulse 84   Resp 18   Wt 91 6 kg (202 lb)   SpO2 97%   BMI 28 17 kg/m²          Physical Exam   Constitutional: He is oriented to person, place, and time  He appears well-developed and well-nourished  HENT:   Head: Normocephalic and atraumatic  Right Ear: External ear normal    Left Ear: External ear normal    Eyes: Pupils are equal, round, and reactive to light  Conjunctivae and EOM are normal    Neck: Normal range of motion  Neck supple  Cardiovascular: Normal rate, regular rhythm and normal heart sounds  Pulmonary/Chest: Effort normal and breath sounds normal    Abdominal: Soft  Bowel sounds are normal    Musculoskeletal: Normal range of motion  Neurological: He is alert and oriented to person, place, and time  Skin: Skin is warm and dry     Psychiatric: He has a normal mood and affect   His behavior is normal

## 2018-12-17 ENCOUNTER — OFFICE VISIT (OUTPATIENT)
Dept: FAMILY MEDICINE CLINIC | Facility: CLINIC | Age: 68
End: 2018-12-17
Payer: MEDICARE

## 2018-12-17 VITALS
BODY MASS INDEX: 28.59 KG/M2 | DIASTOLIC BLOOD PRESSURE: 64 MMHG | TEMPERATURE: 97.8 F | WEIGHT: 205 LBS | HEART RATE: 94 BPM | SYSTOLIC BLOOD PRESSURE: 116 MMHG | RESPIRATION RATE: 20 BRPM | OXYGEN SATURATION: 94 %

## 2018-12-17 DIAGNOSIS — J01.40 ACUTE NON-RECURRENT PANSINUSITIS: Primary | ICD-10-CM

## 2018-12-17 DIAGNOSIS — Z20.828 EXPOSURE TO THE FLU: ICD-10-CM

## 2018-12-17 DIAGNOSIS — R06.2 WHEEZING: ICD-10-CM

## 2018-12-17 DIAGNOSIS — J43.8 OTHER EMPHYSEMA (HCC): ICD-10-CM

## 2018-12-17 PROCEDURE — 99213 OFFICE O/P EST LOW 20 MIN: CPT | Performed by: NURSE PRACTITIONER

## 2018-12-17 RX ORDER — FLUTICASONE PROPIONATE 50 MCG
2 SPRAY, SUSPENSION (ML) NASAL DAILY
Qty: 16 G | Refills: 0 | Status: SHIPPED | OUTPATIENT
Start: 2018-12-17

## 2018-12-17 RX ORDER — OSELTAMIVIR PHOSPHATE 75 MG/1
75 CAPSULE ORAL EVERY 12 HOURS SCHEDULED
Qty: 10 CAPSULE | Refills: 0 | Status: SHIPPED | OUTPATIENT
Start: 2018-12-17 | End: 2018-12-17

## 2018-12-17 RX ORDER — ALBUTEROL SULFATE 90 UG/1
2 AEROSOL, METERED RESPIRATORY (INHALATION) EVERY 6 HOURS PRN
Qty: 1 INHALER | Refills: 0 | Status: SHIPPED | OUTPATIENT
Start: 2018-12-17 | End: 2021-11-29 | Stop reason: SDUPTHER

## 2018-12-17 RX ORDER — OSELTAMIVIR PHOSPHATE 75 MG/1
75 CAPSULE ORAL DAILY
Qty: 7 CAPSULE | Refills: 0 | Status: SHIPPED | OUTPATIENT
Start: 2018-12-17 | End: 2018-12-24

## 2018-12-17 NOTE — PROGRESS NOTES
Assessment/Plan:  1  Take medication as prescribed  2  Follow up if condition changes or worsens       Diagnoses and all orders for this visit:    Acute non-recurrent pansinusitis  -     fluticasone (FLONASE) 50 mcg/act nasal spray; 2 sprays into each nostril daily    Wheezing    Exposure to the flu  -     Discontinue: oseltamivir (TAMIFLU) 75 mg capsule; Take 1 capsule (75 mg total) by mouth every 12 (twelve) hours for 5 days  -     oseltamivir (TAMIFLU) 75 mg capsule; Take 1 capsule (75 mg total) by mouth daily for 7 days    Other emphysema (HCC)  -     albuterol (VENTOLIN HFA) 90 mcg/act inhaler; Inhale 2 puffs every 6 (six) hours as needed for wheezing          Subjective:      Patient ID: Yecenia Davidson is a 76 y o  male  27-year-old male presents with cough, sinus congestion, for about a week  Reports he has some wheezing  Denies medications  Denies fever  Reports a lot of sinus pain and pressure  Reports postnasal drip  Patient reports he was exposed to the flu this week  Denies any fever symptoms  The following portions of the patient's history were reviewed and updated as appropriate: allergies and current medications  Review of Systems   Constitutional: Negative  HENT: Positive for congestion, sinus pain and sinus pressure  Respiratory: Positive for cough and wheezing  Objective:      /64   Pulse 94   Temp 97 8 °F (36 6 °C)   Resp 20   Wt 93 kg (205 lb)   SpO2 94%   BMI 28 59 kg/m²          Physical Exam   Constitutional: He appears well-developed and well-nourished  HENT:   Head: Normocephalic and atraumatic  Right Ear: External ear normal    Left Ear: External ear normal    Nose: Right sinus exhibits maxillary sinus tenderness and frontal sinus tenderness  Left sinus exhibits maxillary sinus tenderness and frontal sinus tenderness     Mouth/Throat: Oropharynx is clear and moist    Cardiovascular: Normal rate, regular rhythm and normal heart sounds  Pulmonary/Chest: He has wheezes (Mild)     Cough dry

## 2019-04-30 DIAGNOSIS — I10 ESSENTIAL HYPERTENSION: ICD-10-CM

## 2019-04-30 RX ORDER — AMLODIPINE BESYLATE 5 MG/1
5 TABLET ORAL DAILY
Qty: 90 TABLET | Refills: 3 | Status: SHIPPED | OUTPATIENT
Start: 2019-04-30 | End: 2020-05-13

## 2019-05-21 DIAGNOSIS — I10 ESSENTIAL HYPERTENSION: ICD-10-CM

## 2019-05-28 RX ORDER — LISINOPRIL 40 MG/1
40 TABLET ORAL DAILY
Qty: 90 TABLET | Refills: 0 | Status: SHIPPED | OUTPATIENT
Start: 2019-05-28 | End: 2019-08-21 | Stop reason: SDUPTHER

## 2019-08-13 ENCOUNTER — TELEPHONE (OUTPATIENT)
Dept: FAMILY MEDICINE CLINIC | Facility: CLINIC | Age: 69
End: 2019-08-13

## 2019-08-13 DIAGNOSIS — R73.03 PREDIABETES: ICD-10-CM

## 2019-08-13 DIAGNOSIS — F41.9 ANXIETY DISORDER, UNSPECIFIED TYPE: ICD-10-CM

## 2019-08-13 DIAGNOSIS — E78.2 MIXED HYPERLIPIDEMIA: ICD-10-CM

## 2019-08-13 DIAGNOSIS — R94.5 ABNORMAL RESULTS OF LIVER FUNCTION STUDIES: ICD-10-CM

## 2019-08-13 DIAGNOSIS — K21.9 GASTROESOPHAGEAL REFLUX DISEASE, ESOPHAGITIS PRESENCE NOT SPECIFIED: Primary | ICD-10-CM

## 2019-08-13 DIAGNOSIS — I10 BENIGN ESSENTIAL HYPERTENSION: ICD-10-CM

## 2019-08-13 DIAGNOSIS — N42.9 DISORDER OF PROSTATE: ICD-10-CM

## 2019-08-13 DIAGNOSIS — Z12.5 PROSTATE CANCER SCREENING: ICD-10-CM

## 2019-08-13 DIAGNOSIS — Z11.59 ENCOUNTER FOR SCREENING FOR OTHER VIRAL DISEASES: ICD-10-CM

## 2019-08-13 DIAGNOSIS — R53.83 FATIGUE, UNSPECIFIED TYPE: ICD-10-CM

## 2019-08-13 NOTE — TELEPHONE ENCOUNTER
Dr Mely Balderrama      Pt was Dr Gurdeep Buitrago  Pt    Will see you on 9-3-2019    Last time was here was      Needs full  BW for lab tamiko and psa test  please let him know when is ready to    [phone] cell

## 2019-08-15 LAB
ALBUMIN SERPL-MCNC: 4.9 G/DL (ref 3.6–4.8)
ALBUMIN/GLOB SERPL: 1.6 {RATIO} (ref 1.2–2.2)
ALP SERPL-CCNC: 64 IU/L (ref 39–117)
ALT SERPL-CCNC: 118 IU/L (ref 0–44)
AST SERPL-CCNC: 87 IU/L (ref 0–40)
BASOPHILS # BLD AUTO: 0 X10E3/UL (ref 0–0.2)
BASOPHILS NFR BLD AUTO: 1 %
BILIRUB SERPL-MCNC: 0.9 MG/DL (ref 0–1.2)
BUN SERPL-MCNC: 20 MG/DL (ref 8–27)
BUN/CREAT SERPL: 18 (ref 10–24)
CALCIUM SERPL-MCNC: 10.1 MG/DL (ref 8.6–10.2)
CHLORIDE SERPL-SCNC: 101 MMOL/L (ref 96–106)
CHOLEST SERPL-MCNC: 195 MG/DL (ref 100–199)
CO2 SERPL-SCNC: 20 MMOL/L (ref 20–29)
CREAT SERPL-MCNC: 1.09 MG/DL (ref 0.76–1.27)
EOSINOPHIL # BLD AUTO: 0.1 X10E3/UL (ref 0–0.4)
EOSINOPHIL NFR BLD AUTO: 2 %
ERYTHROCYTE [DISTWIDTH] IN BLOOD BY AUTOMATED COUNT: 13.4 % (ref 12.3–15.4)
EST. AVERAGE GLUCOSE BLD GHB EST-MCNC: 123 MG/DL
GLOBULIN SER-MCNC: 3.1 G/DL (ref 1.5–4.5)
GLUCOSE SERPL-MCNC: 134 MG/DL (ref 65–99)
HBA1C MFR BLD: 5.9 % (ref 4.8–5.6)
HBV SURFACE AB SER QL: NON REACTIVE
HBV SURFACE AG SERPL QL IA: NEGATIVE
HCT VFR BLD AUTO: 45.1 % (ref 37.5–51)
HCV AB S/CO SERPL IA: <0.1 S/CO RATIO (ref 0–0.9)
HDLC SERPL-MCNC: 43 MG/DL
HGB BLD-MCNC: 15 G/DL (ref 13–17.7)
IMM GRANULOCYTES # BLD: 0 X10E3/UL (ref 0–0.1)
IMM GRANULOCYTES NFR BLD: 0 %
LDLC SERPL CALC-MCNC: 122 MG/DL (ref 0–99)
LYMPHOCYTES # BLD AUTO: 1.5 X10E3/UL (ref 0.7–3.1)
LYMPHOCYTES NFR BLD AUTO: 31 %
MCH RBC QN AUTO: 32.9 PG (ref 26.6–33)
MCHC RBC AUTO-ENTMCNC: 33.3 G/DL (ref 31.5–35.7)
MCV RBC AUTO: 99 FL (ref 79–97)
MONOCYTES # BLD AUTO: 0.5 X10E3/UL (ref 0.1–0.9)
MONOCYTES NFR BLD AUTO: 9 %
NEUTROPHILS # BLD AUTO: 2.9 X10E3/UL (ref 1.4–7)
NEUTROPHILS NFR BLD AUTO: 57 %
PLATELET # BLD AUTO: 312 X10E3/UL (ref 150–450)
POTASSIUM SERPL-SCNC: 4.9 MMOL/L (ref 3.5–5.2)
PROT SERPL-MCNC: 8 G/DL (ref 6–8.5)
PSA FREE MFR SERPL: 52.5 %
PSA FREE SERPL-MCNC: 0.21 NG/ML
PSA SERPL-MCNC: 0.4 NG/ML (ref 0–4)
RBC # BLD AUTO: 4.56 X10E6/UL (ref 4.14–5.8)
SL AMB EGFR AFRICAN AMERICAN: 80 ML/MIN/1.73
SL AMB EGFR NON AFRICAN AMERICAN: 69 ML/MIN/1.73
SL AMB VLDL CHOLESTEROL CALC: 30 MG/DL (ref 5–40)
SODIUM SERPL-SCNC: 139 MMOL/L (ref 134–144)
TRIGL SERPL-MCNC: 149 MG/DL (ref 0–149)
TSH SERPL DL<=0.005 MIU/L-ACNC: 1.97 UIU/ML (ref 0.45–4.5)
WBC # BLD AUTO: 5 X10E3/UL (ref 3.4–10.8)

## 2019-08-21 DIAGNOSIS — I10 ESSENTIAL HYPERTENSION: ICD-10-CM

## 2019-08-21 RX ORDER — LISINOPRIL 40 MG/1
TABLET ORAL
Qty: 90 TABLET | Refills: 0 | Status: SHIPPED | OUTPATIENT
Start: 2019-08-21 | End: 2019-11-16 | Stop reason: SDUPTHER

## 2019-09-03 ENCOUNTER — OFFICE VISIT (OUTPATIENT)
Dept: FAMILY MEDICINE CLINIC | Facility: CLINIC | Age: 69
End: 2019-09-03
Payer: MEDICARE

## 2019-09-03 VITALS
HEIGHT: 71 IN | DIASTOLIC BLOOD PRESSURE: 70 MMHG | RESPIRATION RATE: 20 BRPM | BODY MASS INDEX: 28.98 KG/M2 | SYSTOLIC BLOOD PRESSURE: 104 MMHG | OXYGEN SATURATION: 94 % | WEIGHT: 207 LBS | HEART RATE: 87 BPM | TEMPERATURE: 97.6 F

## 2019-09-03 DIAGNOSIS — E78.2 MIXED HYPERLIPIDEMIA: ICD-10-CM

## 2019-09-03 DIAGNOSIS — I10 BENIGN ESSENTIAL HYPERTENSION: Primary | ICD-10-CM

## 2019-09-03 DIAGNOSIS — F41.8 OTHER SPECIFIED ANXIETY DISORDERS: ICD-10-CM

## 2019-09-03 DIAGNOSIS — R09.82 ALLERGIC RHINITIS WITH POSTNASAL DRIP: ICD-10-CM

## 2019-09-03 DIAGNOSIS — Z23 ENCOUNTER FOR IMMUNIZATION: ICD-10-CM

## 2019-09-03 DIAGNOSIS — Z12.11 SCREENING FOR MALIGNANT NEOPLASM OF COLON: ICD-10-CM

## 2019-09-03 DIAGNOSIS — R73.03 PREDIABETES: ICD-10-CM

## 2019-09-03 DIAGNOSIS — R06.83 SNORING: ICD-10-CM

## 2019-09-03 DIAGNOSIS — J30.9 ALLERGIC RHINITIS WITH POSTNASAL DRIP: ICD-10-CM

## 2019-09-03 PROBLEM — L25.9 CONTACT DERMATITIS: Status: RESOLVED | Noted: 2018-07-17 | Resolved: 2019-09-03

## 2019-09-03 PROBLEM — N52.9 ERECTILE DYSFUNCTION: Status: RESOLVED | Noted: 2017-02-20 | Resolved: 2019-09-03

## 2019-09-03 PROBLEM — J01.40 ACUTE NON-RECURRENT PANSINUSITIS: Status: RESOLVED | Noted: 2018-12-17 | Resolved: 2019-09-03

## 2019-09-03 PROCEDURE — G0010 ADMIN HEPATITIS B VACCINE: HCPCS

## 2019-09-03 PROCEDURE — 99214 OFFICE O/P EST MOD 30 MIN: CPT | Performed by: FAMILY MEDICINE

## 2019-09-03 PROCEDURE — 90746 HEPB VACCINE 3 DOSE ADULT IM: CPT

## 2019-09-03 RX ORDER — ADHESIVE BANDAGE 3/4"
BANDAGE TOPICAL
Qty: 1 EACH | Refills: 0 | Status: SHIPPED | OUTPATIENT
Start: 2019-09-03

## 2019-09-03 NOTE — PROGRESS NOTES
Chief concern and HPI: Diomedes Gramajo is a 71 y o  male  Here to establish care  NO acute concerns    Previous relevant clinical information reviewed from medical, surgical and psychosocial history, medication and allergies and labs/studies  Patient Active Problem List   Diagnosis    Abnormal results of liver function studies    Elevated blood-pressure reading without diagnosis of hypertension    Acute post-traumatic stress disorder    Anxiety disorder    Benign essential hypertension    Neck pain    Concussion    Erectile dysfunction    Fatigue    Ganglion cyst of wrist    Hyperlipidemia    Low back pain    Palpitations    Postconcussion syndrome    Healthcare maintenance    Shortness of breath    Allergic rhinitis with postnasal drip    Mild intermittent asthma without complication    Gastroesophageal reflux disease    Snoring    Contact dermatitis    Ventilation pneumonitis (HCC)    Atrophy, yellow liver, chronic (HCC)    Dizziness    Insomnia    Right knee pain    Pain in joint of right wrist    Open wound of scalp    Prediabetes    Acute non-recurrent pansinusitis    Wheezing    Exposure to the flu    Other emphysema ([facility] [address] )         Review of systems: No new or worsening:   Unexplained fever/chills/sweats/weight loss  HEENT issues such as new ear, mouth, nose or eye problems  Respiratory issues such as new shortness of breath, cough  Heart issues such as new chest pain or pressure, palpitations  Abdominal or digestive issues such as diarrhea, constipation or blood in stool    BM's are normal  Genitourinary issues  Neurological issues such as new numbness or motor weakness  Psychological issues such as depression or anxiety  Skin issues such as new rashes      Exam:  Alert, no acute distress /70 (BP Location: Left arm, Patient Position: Sitting, Cuff Size: Adult)   Pulse 87   Temp 97 6 °F (36 4 °C) (Tympanic)   Resp 20   Ht 5' 11" (1 803 m)   Wt 93 9 kg (207 lb) SpO2 94%   BMI 28 87 kg/m²   HEENT: Head normocephalic and atraumatic  Lungs: No respiratory labor  Clear to auscultation  Cardiac: Regular rate and rhythm  No murmur, rub or gallop  Abdomen: Benign  Normal active bowel sounds  Soft and non-tender  No organomegaly  Extremities: No cyanosis, clubbing or edema  Neuro screen: No gross deficits      Summary Impression:  1  Benign essential hypertension  Perhaps over-treated, Keep home BP log and send me exsulint message  - Blood Pressure Monitoring (BLOOD PRESSURE CUFF) [facility]; Check BP a few times a week  Dispense: 1 each; Refill: 0  - Cologuard; Future    2  Mixed hyperlipidemia  Monitor  Discussed healthier diet  3  Prediabetes  Curcumin offerred  Healthier diet  Continue daily walk    4  Snoring  Pulm consult  - Ambulatory referral to Sleep Medicine; Future  - Ambulatory referral to Pulmonology; Future    5  Screening for malignant neoplasm of colon  Cologuard    6  Allergic rhinitis with postnasal drip  stable    7  Other specified anxiety disorders  Better with semi-senior living  Xanax used only a few times a year      Risks and benefits of therapeutic plan discussed, answered all patient questions and concerns and patient expressed understanding and agreement of therapeutic plan  BMI Counseling: Body mass index is 28 87 kg/m²  Discussed the patient's BMI with him  The BMI is above average  BMI counseling and education was provided to the patient  Nutrition recommendations include 3-5 servings of fruits/vegetables daily        Follow up plan: 3 months

## 2019-09-03 NOTE — PATIENT INSTRUCTIONS
Curcumins are potent anti-inflammatory natural medicines derived from turmeric, the common [place] ([facility]) spice  In order to reduce inflammation in the body, it is important to use brands that are specifically designed to be absorbed from the gut and are reputed to be of good quality, Common uses include for arthritis, autoimmune disorders, chronic pain or the inflammatory component of chronic diseases such as diabetes, heart disease and others  These can be purchased at local healthy food stores such as [employer] or reputable online sites such as www  [employer]  com    Pick one of the following brands:  1  Doctor's Best - 'Best Curcumin C3 Complex with BioPerine' 1 gram twice a day with or without food  2  Nutrigold 'Turmeric Curcumin Gold' 500 mg cap once to three times a day with food  3  Life Extension 'Super Bio-Curcumin' 400 mg 1 - 2 caps daily  4  NutriPhysical Curcumin Extreme 400 mg 1 cap daily  5  Indena S p  A  Meriva 500 mg 1 - 2 caps daily (available at [employer]  com)    A good combination botanical with both Curcumin and Boswellia is [employer] Knees an Joints: 1 cap 3x/day also will give you 1000 units of Vit  D3  Boswellia is generally safe and well tolerated, but occasionally can cause GI upset  Generally it is best to take Curcumin with a meal that has some fat (to help absorption), though the above formulations do have improved absorption even if not taken with a meal        Generally these supplements are very safe and well tolerated  Occasionally any supplement can cause stomach upset but this is unusual  Other side effects such as liver irritation are uncommon  Very high doses could thin the blood, so let your doctor know if you are on strong blood thinners such as Warfarin (Coumadin)  Patients with active gallbladder disease (biliary colic) might want to avoid high doses of curcumin     High dose Turmeric may increase urinary oxalate levels, theoretically increasing kidney stone formation is susceptible patients  Curcumin may bind iron, so if iron deficient, take iron and turmeric/curcumin at different times  Curcumin with the bioavailability (absorption) enhancer piperine from black pepper could increase the absorption of certain medications such as phenytoin, rifampin, propranolol, amlodipine, felodipine, nevirapine, so it might be best to take any of these drugs at a different time than Curcumin with Bioperine  All women who are pregnant or breast-feeding should discuss with their doctor before using any supplement or medicine  GLYCEMIC LOAD WORKSHEET   PRESENT DIET LOW GI IMPROVEMENTS COMMENTS   BREAKFAST          LUNCH          DINNER          SNACKS & BEVERAGES                BREAKFAST          LUNCH          DINNER          SNACKS & BEVERAGES              BREAKFAST          LUNCH          DINNER          SNACKS & BEVERAGES                          BREADS - Examples of lower GI breads (40 - 50s):  1  Ezekial (Food for Life) sprouted grain breads   2  [employer] cracked wheat & 100% whole grain wheat  3  [employer] sprouted Wheat  4  Whole grain Pumpernickel bread (41)  5  Whole wheat Ioana bread (57)  6  Sourdough breads (~52)  7  Michael's Killer Bread    BREAKFAST CEREALS  Hot:  1  Gnosticist 100% Old-fashioned oats (42)  2  Muesli (~ 45)  3  Oat bran (55)  Cold:  1  All-Bran (30 ~ 45)  2  Venitas Bran Buds with Psyllium (45)  3  Special K breakfast cereal (54)  4  Mini Wheats breakfast cereal, whole wheat (58)    DAIRY  1  Milk (31)  2  Yoghurt (~35)  3  Custard (35)    FRUIT  1  Most temperate fruits have low GI value (~20 - 60)  2  Tropical fruits a little higher (50~60)    GRAINS  1  Barley (GI=25)  2  Bulgur (48)  3  High amylose rice: Basmati (58) or Uncle Bens Converted rice (44)  4  Oat bran (55)  5  Rice bran (19)  PASTA  1  Generally medium to low GI (~40s)  Use whole grain    LEGUMES  1  Most low GI   2  Soy (20)    VEGETABLES  1   Almost all low GI except white potatoes & beets  Other starchy vegetables:  2  Peas (48)  3  Corn (55)  4  Sweet potato (44)      NUTS - All low GI    PROTEIN FOOD such as Beef, Poultry, Seafood, Eggs, Cheese have almost 0 GI  TREATS  1  Cookies & Crackers (40 ~ 70)  2  Ice cream (~38 for high fat to ~40s for reduced fat to ~60s for regular)  3  Potato & Corn chips (40~50s)  4  Honey - Use in moderation (55)  5  Table sugar - Use in moderation (61)    CONVENIENCE FOODS  1  Pizza - choose whole grain with veggies (30~60s)    AVOID OR MINIMIZE:  1  Finely milled flour products such as Breads, Bagels, Croissants, Muffins, Donuts, Scones, Pastries, Waffles, Pancakes  2  Most Commercial Cereals such as Cornflakes, Total  3  White potatoes & Low amylose (andrey   sticky or Carey) rice

## 2019-09-26 PROBLEM — R73.03 DIABETES MELLITUS, LATENT: Status: ACTIVE | Noted: 2019-09-26

## 2019-11-04 ENCOUNTER — CLINICAL SUPPORT (OUTPATIENT)
Dept: FAMILY MEDICINE CLINIC | Facility: CLINIC | Age: 69
End: 2019-11-04
Payer: MEDICARE

## 2019-11-04 DIAGNOSIS — Z23 ENCOUNTER FOR IMMUNIZATION: Primary | ICD-10-CM

## 2019-11-04 PROCEDURE — 90746 HEPB VACCINE 3 DOSE ADULT IM: CPT

## 2019-11-04 PROCEDURE — G0010 ADMIN HEPATITIS B VACCINE: HCPCS

## 2019-11-16 DIAGNOSIS — I10 ESSENTIAL HYPERTENSION: ICD-10-CM

## 2019-11-18 RX ORDER — LISINOPRIL 40 MG/1
TABLET ORAL
Qty: 90 TABLET | Refills: 0 | Status: SHIPPED | OUTPATIENT
Start: 2019-11-18 | End: 2020-03-02

## 2019-12-03 ENCOUNTER — OFFICE VISIT (OUTPATIENT)
Dept: FAMILY MEDICINE CLINIC | Facility: CLINIC | Age: 69
End: 2019-12-03
Payer: MEDICARE

## 2019-12-03 VITALS
TEMPERATURE: 97.1 F | DIASTOLIC BLOOD PRESSURE: 68 MMHG | SYSTOLIC BLOOD PRESSURE: 116 MMHG | WEIGHT: 212 LBS | OXYGEN SATURATION: 95 % | HEART RATE: 72 BPM | BODY MASS INDEX: 29.57 KG/M2 | RESPIRATION RATE: 18 BRPM

## 2019-12-03 DIAGNOSIS — K21.9 GASTROESOPHAGEAL REFLUX DISEASE WITHOUT ESOPHAGITIS: ICD-10-CM

## 2019-12-03 DIAGNOSIS — R15.2 RECTAL URGENCY: ICD-10-CM

## 2019-12-03 DIAGNOSIS — R09.82 ALLERGIC RHINITIS WITH POSTNASAL DRIP: ICD-10-CM

## 2019-12-03 DIAGNOSIS — I10 ESSENTIAL HYPERTENSION: Primary | ICD-10-CM

## 2019-12-03 DIAGNOSIS — G89.29 CHRONIC BILATERAL LOW BACK PAIN WITHOUT SCIATICA: ICD-10-CM

## 2019-12-03 DIAGNOSIS — R73.03 PREDIABETES: ICD-10-CM

## 2019-12-03 DIAGNOSIS — J30.9 ALLERGIC RHINITIS WITH POSTNASAL DRIP: ICD-10-CM

## 2019-12-03 DIAGNOSIS — M54.50 CHRONIC BILATERAL LOW BACK PAIN WITHOUT SCIATICA: ICD-10-CM

## 2019-12-03 DIAGNOSIS — F41.1 GENERALIZED ANXIETY DISORDER: ICD-10-CM

## 2019-12-03 DIAGNOSIS — E78.2 MIXED HYPERLIPIDEMIA: ICD-10-CM

## 2019-12-03 DIAGNOSIS — J45.20 MILD INTERMITTENT ASTHMA WITHOUT COMPLICATION: ICD-10-CM

## 2019-12-03 DIAGNOSIS — F41.9 ANXIETY: ICD-10-CM

## 2019-12-03 PROBLEM — F10.11 HISTORY OF ALCOHOL ABUSE: Status: ACTIVE | Noted: 2019-12-03

## 2019-12-03 PROCEDURE — 99214 OFFICE O/P EST MOD 30 MIN: CPT | Performed by: FAMILY MEDICINE

## 2019-12-03 RX ORDER — VENLAFAXINE 75 MG/1
75 TABLET ORAL DAILY
Qty: 30 TABLET | Refills: 3 | Status: SHIPPED | OUTPATIENT
Start: 2019-12-03 | End: 2020-06-11

## 2019-12-03 NOTE — PATIENT INSTRUCTIONS
Tips for Good Sleep     Start with basic sleep hygiene:  1  Life can be hectic, but do your best to honor the fundamental rhythmic infrastructure of life:    Regular bed and rising time, obtaining exposure to early morning light and evening dim light    Regular times for meals and exercise  Don't nap  Although napping is known to offer a number of medical and psychological benefits, it is contraindicated with insomnia  2   Reduce 'body noise':    Manage caffeine, nicotine, alcohol and other drugs  Alcohol should never be taken as a sleep aid because it suppresses deep & REM (dreaming) sleep which are necessary for health  Given its substantial half-life, standard cautions about caffeine may not be sufficiently conservative for many  Avoid all caffeine and other stimulant use after noon  3  Reduce mind noise: Anxiety and depression, high stress and unresolved emotions all are common causes of disturbed sleep (insomnia)  Talk to your doctor about further treatment of these, including seeing a therapist to assist resolving unprocessed emotions  Use the Bedroom (or at least the bed) for sleep & sex only  Don't have a TV in the bedroom, and don't work in the bedroom     Go to bed only when sleepy  Develop a consistent bedtime ritual to facilitate letting go of wakefulness, e g  warm bath, journaling (excellent to process emotions), restful practices that induce relation response such as relaxation exercises  Headspace Premium version has a 30 part healthy sleep module set and a nice sleep session  Turn lights down low an hour prior to bedtime  If bright lights are unavoidable in the evening, use duane/orange glasses to block melatonin-suppressing blue light for the 1  2 hrs  prior to bedtime  Keep evening entertainment light  Don't watch the evening news if upsetting  Light comedy is ok   Avoid 'post-dramatic' stress disorder such as watching scary movies and news     Emphasize the key process of letting go or surrender in sleep onset  In the end, we cannot finagle sleep  We can set the stage and be receptive to it, but we cannot intentionally "go to sleep " Efforts to do so typically backfire     4  Reduce bedroom noise:    Healthy sleep environment: Bedroom cool (~ 76 F), completely dark, quiet,   psychologically safe and as environmentally 'green' as is feasible  Dusk simulation by dimming lights 1  3 hrs  prior to bedtime  HEPA filtration & houseplants, organic and non-   toxic bedding  Avoid clock watching - position the clock away from the bed       3  Regular exercise is critical and encourages deep sleep    However, avoid aerobics at least 3  4 hours prior to bed because it raises core temperature, which can interfere with sleep  5  Nutrition:    Avoid high glycemic (like processed bakery goods & snacks) and harder to digest foods as bedtime snacks  Avoid GERD (heartburn)  aggravating foods  As an alternative, consider complex carbohydrates that may help transport tryptophan, a precursor to melatonin, across the blood-brain barrier  Calcium, Mg, B-complex support sleep    Other sleep aids:   Sleep masks can help to keep dark  Also comforting   Ear plugs   White noise: fans and humidifiers,   'Sleep Bug' and other apps   Brain wave entrainment      6  Supplements that support sleep     Botanicals alone or in combination like valerian, lemon balm, lavender, chamomile (caution, some diuretic effect), and/or hops  A good brand is 'Phytocalm' by [employer]   Valerian ( Valeriana officinalis ) root  Requires 2  3 weeks to work  Dose: 1-3 grams crude root or 800-1200 mg of an extract standardized to 0 8-1 0% valerenic acid taken 30-60 minutes   prior to bedtime   More for chronic insomnia  Can be used as part of wean off benzos        Hops ( Humulus lupulus) Commission E approved for anxiety and sleep disturbance Dose is 300-500 mg before bed either in capsule or tincture  Melatonin is useful in aging populations and/or with circadian   irregularities  Always couple supplementation with other sleep hygiene recommendations  Dose: Adults: 0 25-0 5 mg SL sustained-release: ( not the common 3 mg tab)  SL bypasses first pass liver metabolism & has more reliable levels  Take at ~ 8PM rather than at bedtime   ~ 11 PM  Not a sleeping pill  Rather a gentle invitation to sleep, but will   not override a lot of noise  As melatonin levels rise, body temp  lowers  Adverse effects rare & usually related to v  high doses  May   increase dreaming  Melatonin works best in jet lag, shift workers, visually blind to re-set circadian   rhythm  For jetlag, works best if travel eastward  Take it a couple of   hours prior to when would have gone to sleep at one's origin  5-Hydroxytryptophan (5-HTP) Dose: for depression and anxiety is  mg, one to three times per day  For sleep,  mg, 30-60 min prior to bedtime  Adverse effects: > 300 mg per day may ocasionaly cause GI upset such as nausea  In rare cases, 5HTP supplements have been associated with a serious medical condition called eosinophilia-myalgia syndrome (EMS)  People with EMS have severe muscle and joint pains, and some may have other symptoms such as a rash, skin thickening, trouble breathing, swelling, numbness and tingling, trouble concentrating, fever, and fatigue  It is unclear if the cases of EMS were due to contamination of the supplements or due to a direct cause of the 5HTP itself  If you choose to use 5HTP, please be aware of the symptoms of EMS and see a doctor if you develop any of the above  Use this supplement only with your doctors explicit guidance  Counterproductive measures that don't help much in the long run:   Hypnotics don't work, with no improvement on sleep polysomography  They just prevent remembering poor sleep   Include Ambien, Lunesta, Benzodiazepines    OTC sleepers such as Benadryl (diphenhydramine) are problematic - anti-cholinergic side effects like dry mouth, can lead to REM suppression  ½ life ~ 18 hrs  can disturb memory  Staying longer in bed when didn't sleep well is generally counter-productive          GERD (Gastroesophageal Reflux Disease) is caused by reflux of stomach acid into the esophagus (food tube)  It is a common condition and often occurs with general indigestion  Common causes include the Western lifestyle, especially the following:   Eating a Western diet full of processed high calorie to nutrient ratio food  About 80% of adults have an inadequate diet, so this is a foundational change required for most patients suffering with GERD  Eating quickly in stressful environments ('gobble, gulp & go')  Obesity  High stress, poor posture  Certain medicines and substances can aggravate GERD      How to treat GERD: Most GERD symptoms such as heartburn, regurgitation, and hoarseness can be adequately treated with the following:    Healthy eating:  Eat a plant based, whole foods diet  Avoid processed food, including any food product made from typical commercial flour products (e g  Commercial breads, pastries)  Take a probiotic containing food daily, e g  Kefir, yogurt    If overweight, losing weight often improves symptoms    Avoid any food that aggravates your symptoms  These commonly include: coffee, smoking, alcohol, dairy, citrus (e g  orange juice), tomatoes, spicy foods or mint    Elevate head of the bed at night about 6 inches by putting blocks under wear the bed posts at the head of the bed meet the floor  Avoid eating within 2 hours of bedtime    Eat slowly and thoroughly chew your food - saliva neutralizes the effects of stomach acid on irritating the esophagus  Chewing gum after a meal enhances saliva production       Consider a trial of the elimination diet if you suspect a food allergy/intolerance        Musculoskeletal aspects of care:  Good posture is important to decrease tendency to reflux  Don't hunch over  Breath in a relaxed manner deep in the belly, rather than high in the chest  Somatics materials: [email or web address] [email or web address]  com/catOrdering html: Somatic Exercises[TM] narrated by Supriya Pride Ed D : Somatic Exercises[TM ]for Healthy Breathing (CDs)    Avoid tight binders around the abdomen (e g  Corsets in women), which will increase abdominal pressure and therefore reflux  Sleep on your left side (this prevents stomach contents from refluxing into the esophagus)    Re-train the muscles involved in swallowing as well as strengthen the diaphragm - key in reducing any hiatal hernia  Abnormal rhythm (peristalsis) of the muscles in the food tube occur in about 1/2 of GERD patients  Can use a commercial device such as Comutooro (IQoro com)  Expect it to take several weeks or months for improvement  This may also help snoring and obstructive sleep apnea  The device does have some scientific evidence supporting its use, but all by one researcher with commercial connection to the product  It is fairly expensive, and it is unlikely that insurance companies will cover it  Supplements     Consider Slippery elm after a meal - can get tabs (e g  Prudencio's slippery elm lozenges) or powder - mix with a little water to make a gruel (can add cinnamon for taste) and take after meals     Consider Marshmallow - use similarly to slippery elm     Consider Deglycyrrhizinated Licorice (DGL) [id number] chewable tabs before meals x 4-6 weeks    Conventional Medicines  Alginate/antacid combos can keep acid away from the lower esophagus  A good brand is Gaviscon Double Action liquid, taken after meals      Simple over the counter (OTC) antacids such as Calcium Carbonate (e g  Tums) may suffice    H2 blockers decrease acid in the stomach (e g  Ranitidine - Zantac, Famotidine - Pepcid, and others)    Proton Pump blockers (PPIs) are stronger acid-suppressors than H2 blockers  Examples are Omeprazole, Pantoprazole, Esomeprazole - Nexium, Rabeprazole - Aciphex and others)  They may be needed for a short time to get control of symptoms and are approved for up to 8 weeks for GERD (up to 16 weeks or even longer for more severe symptoms)  Because of numerous concerns about possible long term side effects, such as magnesium deficiency, increase of some fractures, pneumonia and opportunistic GI infections such as Clostridia difficile, the risk/benefit ratio of long term use should be carefully evaluated  If needed, your health care provider can guide you how to taper off these medicines slowly (do not stop abruptly, because you might get rebound symptoms)

## 2019-12-03 NOTE — PROGRESS NOTES
SUBJECTIVE:  Chief complaint and HPI: Jojo Mujica is a 71 y o  male who presents for follow up of multiple chronic medical problems, as listed below in problem list  All problems are relatively stable except for the following issues: Some LBP after new job at [address] [employer] for 9 hour shifts  Tylenol helps  Knows appropriate dose  NO red flag sx  Some GERD flares with cookies, fatty food, lettuce  Some IBS symptoms flare with loose stool, some rectal urgency for past 9 months  NO blood except rare red streak on toilet paper  Still eating significant fatty meats  Has gained several pounds since last visity  Only occasional beer now  States mo more heavy drinking  Exercise: walks a lot  1 - 2 miles a day  No deliberate exercise but on his feet at new job  Sleep: snores  Doesn't want apnea test  Sometimes well rested when wakes up, sometimes needs a nap mid-day  Review of systems:  No fever/chills/sweats/unexplained weight loss  No chest pain/shortness of breath  No change in urination      Chart reviewed for relevant medical, surgical and psychosocial history, medications and allergies, labs and studies  Patient Active Problem List   Diagnosis    Acute post-traumatic stress disorder    Generalized anxiety disorder    Essential hypertension    Ganglion cyst of wrist    Mixed hyperlipidemia    Chronic bilateral low back pain without sciatica    Allergic rhinitis with postnasal drip    Mild intermittent asthma without complication    Gastroesophageal reflux disease without esophagitis    Snoring    Insomnia    Right knee pain    Prediabetes             EXAM:  /68   Pulse 72   Temp (!) 97 1 °F (36 2 °C)   Resp 18   Wt 96 2 kg (212 lb)   SpO2 95%   BMI 29 57 kg/m²   The patient appears well, in no apparent distress  Alert and oriented times three, pleasant and cooperative  Vital signs are as noted by the nurse   /68   Pulse 72   Temp (!) 97 1 °F (36 2 °C) Resp 18   Wt 96 2 kg (212 lb)   SpO2 95%   BMI 29 57 kg/m²     Head: normocephalic, atraumatic  Eyes: well perfused conjunctiva, not clinically pale, not jaundiced  Ears: external ear: no gross lesions  Nose: no rhinorrhea  Neck: supple, trachea in the midline and no concerning cervical lymphadenopathy  Lungs: No respiratory labor  Clear to auscultation  Heart: Regular rate and rhythm, no murmurs, gallops or rubs  Abdomen: Benign: soft, non-tender, non-distended  No guarding or rebound  No masses or organomegaly  Normal bowel sounds,   Skin: No pallor  No rashes noted  Extremities: Moves all extremities normally  No pedal edema  LS SPINE AROM is mildly limited with ache, no radicular sx      ASSESSMENT/PLAN:  1  Essential hypertension  Well controlled    2  Chronic bilateral low back pain without sciatica  Probably adjustment to work  Posture, proper lifting reviewed    3  Generalized anxiety disorder  Well controlled    4  Mixed hyperlipidemia  Encouraged to improve diet  Consider Statin if does not improve with lifestyle change    5  Prediabetes  Encouraged to improve diet    6  Rectal urgency  And occasional choking sensation  - Ambulatory referral to Gastroenterology; Future    7  Gastroesophageal reflux disease without esophagitis  Encouraged to improve diet  See suggestions for natural tx  8  Allergic rhinitis with postnasal drip  Flonase    9  Mild intermittent asthma without complication  stable      Reviewed and reinforced basics of healthy lifestyle  Risks and benefits of therapeutic plan discussed, answered all patient questions and concerns and patient expressed understanding and agreement of therapeutic plan        Follow up plan: 3 months

## 2020-02-29 DIAGNOSIS — I10 ESSENTIAL HYPERTENSION: ICD-10-CM

## 2020-03-02 RX ORDER — LISINOPRIL 40 MG/1
TABLET ORAL
Qty: 90 TABLET | Refills: 3 | Status: SHIPPED | OUTPATIENT
Start: 2020-03-02 | End: 2021-05-28

## 2020-05-13 DIAGNOSIS — I10 ESSENTIAL HYPERTENSION: ICD-10-CM

## 2020-05-13 RX ORDER — AMLODIPINE BESYLATE 5 MG/1
TABLET ORAL
Qty: 90 TABLET | Refills: 3 | Status: SHIPPED | OUTPATIENT
Start: 2020-05-13 | End: 2020-05-14

## 2020-05-14 DIAGNOSIS — I10 ESSENTIAL HYPERTENSION: ICD-10-CM

## 2020-05-14 RX ORDER — AMLODIPINE BESYLATE 5 MG/1
TABLET ORAL
Qty: 90 TABLET | Refills: 3 | Status: SHIPPED | OUTPATIENT
Start: 2020-05-14 | End: 2021-08-09 | Stop reason: SDUPTHER

## 2020-06-10 ENCOUNTER — TELEMEDICINE (OUTPATIENT)
Dept: FAMILY MEDICINE CLINIC | Facility: CLINIC | Age: 70
End: 2020-06-10
Payer: MEDICARE

## 2020-06-10 DIAGNOSIS — F41.1 GENERALIZED ANXIETY DISORDER: Primary | ICD-10-CM

## 2020-06-10 PROCEDURE — 99443 PR PHYS/QHP TELEPHONE EVALUATION 21-30 MIN: CPT | Performed by: FAMILY MEDICINE

## 2020-06-11 ENCOUNTER — OFFICE VISIT (OUTPATIENT)
Dept: FAMILY MEDICINE CLINIC | Facility: CLINIC | Age: 70
End: 2020-06-11
Payer: MEDICARE

## 2020-06-11 VITALS
SYSTOLIC BLOOD PRESSURE: 114 MMHG | DIASTOLIC BLOOD PRESSURE: 68 MMHG | BODY MASS INDEX: 27.58 KG/M2 | HEART RATE: 86 BPM | RESPIRATION RATE: 18 BRPM | OXYGEN SATURATION: 98 % | WEIGHT: 197 LBS | TEMPERATURE: 97.4 F | HEIGHT: 71 IN

## 2020-06-11 DIAGNOSIS — I10 ESSENTIAL HYPERTENSION: ICD-10-CM

## 2020-06-11 DIAGNOSIS — F41.1 GENERALIZED ANXIETY DISORDER: Primary | ICD-10-CM

## 2020-06-11 PROCEDURE — 3074F SYST BP LT 130 MM HG: CPT | Performed by: FAMILY MEDICINE

## 2020-06-11 PROCEDURE — 3008F BODY MASS INDEX DOCD: CPT | Performed by: FAMILY MEDICINE

## 2020-06-11 PROCEDURE — 1036F TOBACCO NON-USER: CPT | Performed by: FAMILY MEDICINE

## 2020-06-11 PROCEDURE — 99213 OFFICE O/P EST LOW 20 MIN: CPT | Performed by: FAMILY MEDICINE

## 2020-06-11 PROCEDURE — 4040F PNEUMOC VAC/ADMIN/RCVD: CPT | Performed by: FAMILY MEDICINE

## 2020-06-11 PROCEDURE — 3078F DIAST BP <80 MM HG: CPT | Performed by: FAMILY MEDICINE

## 2020-06-11 PROCEDURE — 1160F RVW MEDS BY RX/DR IN RCRD: CPT | Performed by: FAMILY MEDICINE

## 2020-06-11 RX ORDER — FLUOXETINE HYDROCHLORIDE 20 MG/1
20 CAPSULE ORAL DAILY
Qty: 30 CAPSULE | Refills: 1 | Status: SHIPPED | OUTPATIENT
Start: 2020-06-11 | End: 2020-06-29 | Stop reason: ALTCHOICE

## 2020-06-29 ENCOUNTER — OFFICE VISIT (OUTPATIENT)
Dept: FAMILY MEDICINE CLINIC | Facility: CLINIC | Age: 70
End: 2020-06-29
Payer: MEDICARE

## 2020-06-29 VITALS
DIASTOLIC BLOOD PRESSURE: 58 MMHG | HEART RATE: 98 BPM | OXYGEN SATURATION: 96 % | HEIGHT: 71 IN | TEMPERATURE: 98 F | WEIGHT: 196 LBS | SYSTOLIC BLOOD PRESSURE: 112 MMHG | RESPIRATION RATE: 18 BRPM | BODY MASS INDEX: 27.44 KG/M2

## 2020-06-29 DIAGNOSIS — K21.9 GASTROESOPHAGEAL REFLUX DISEASE WITHOUT ESOPHAGITIS: ICD-10-CM

## 2020-06-29 DIAGNOSIS — I10 ESSENTIAL HYPERTENSION: ICD-10-CM

## 2020-06-29 DIAGNOSIS — F41.1 GENERALIZED ANXIETY DISORDER: Primary | ICD-10-CM

## 2020-06-29 DIAGNOSIS — R73.03 PREDIABETES: ICD-10-CM

## 2020-06-29 PROCEDURE — 4040F PNEUMOC VAC/ADMIN/RCVD: CPT | Performed by: FAMILY MEDICINE

## 2020-06-29 PROCEDURE — 1036F TOBACCO NON-USER: CPT | Performed by: FAMILY MEDICINE

## 2020-06-29 PROCEDURE — 3078F DIAST BP <80 MM HG: CPT | Performed by: FAMILY MEDICINE

## 2020-06-29 PROCEDURE — 3074F SYST BP LT 130 MM HG: CPT | Performed by: FAMILY MEDICINE

## 2020-06-29 PROCEDURE — 1160F RVW MEDS BY RX/DR IN RCRD: CPT | Performed by: FAMILY MEDICINE

## 2020-06-29 PROCEDURE — 3008F BODY MASS INDEX DOCD: CPT | Performed by: FAMILY MEDICINE

## 2020-06-29 PROCEDURE — 99214 OFFICE O/P EST MOD 30 MIN: CPT | Performed by: FAMILY MEDICINE

## 2020-06-29 RX ORDER — VENLAFAXINE HYDROCHLORIDE 75 MG/1
75 CAPSULE, EXTENDED RELEASE ORAL
Qty: 90 CAPSULE | Refills: 3 | Status: SHIPPED | OUTPATIENT
Start: 2020-06-29 | End: 2021-08-01 | Stop reason: SDUPTHER

## 2020-06-29 RX ORDER — LORAZEPAM 0.5 MG/1
0.5 TABLET ORAL DAILY
Qty: 30 TABLET | Refills: 0 | Status: SHIPPED | OUTPATIENT
Start: 2020-06-29 | End: 2021-08-18

## 2020-06-30 ENCOUNTER — TRANSCRIBE ORDERS (OUTPATIENT)
Dept: ADMINISTRATIVE | Facility: HOSPITAL | Age: 70
End: 2020-06-30

## 2020-06-30 ENCOUNTER — APPOINTMENT (OUTPATIENT)
Dept: LAB | Facility: HOSPITAL | Age: 70
End: 2020-06-30
Attending: FAMILY MEDICINE
Payer: MEDICARE

## 2020-06-30 DIAGNOSIS — Z12.5 PROSTATE CANCER SCREENING: ICD-10-CM

## 2020-06-30 DIAGNOSIS — R73.03 PREDIABETES: ICD-10-CM

## 2020-06-30 DIAGNOSIS — R73.03 PRE-DIABETES: Primary | ICD-10-CM

## 2020-06-30 DIAGNOSIS — I10 ESSENTIAL HYPERTENSION: Primary | ICD-10-CM

## 2020-06-30 DIAGNOSIS — R53.83 FATIGUE, UNSPECIFIED TYPE: ICD-10-CM

## 2020-06-30 DIAGNOSIS — N42.9 DISORDER OF PROSTATE: ICD-10-CM

## 2020-06-30 DIAGNOSIS — K21.9 GASTROESOPHAGEAL REFLUX DISEASE, ESOPHAGITIS PRESENCE NOT SPECIFIED: ICD-10-CM

## 2020-06-30 DIAGNOSIS — Z11.59 ENCOUNTER FOR SCREENING FOR OTHER VIRAL DISEASES: ICD-10-CM

## 2020-06-30 DIAGNOSIS — I10 BENIGN ESSENTIAL HYPERTENSION: ICD-10-CM

## 2020-06-30 DIAGNOSIS — E78.2 MIXED HYPERLIPIDEMIA: ICD-10-CM

## 2020-06-30 DIAGNOSIS — R94.5 ABNORMAL RESULTS OF LIVER FUNCTION STUDIES: ICD-10-CM

## 2020-06-30 PROBLEM — K76.0 HEPATIC STEATOSIS: Status: ACTIVE | Noted: 2020-06-30

## 2020-06-30 LAB
ALBUMIN SERPL BCP-MCNC: 4 G/DL (ref 3.5–5)
ALP SERPL-CCNC: 67 U/L (ref 46–116)
ALT SERPL W P-5'-P-CCNC: 126 U/L (ref 12–78)
ANION GAP SERPL CALCULATED.3IONS-SCNC: 11 MMOL/L (ref 4–13)
AST SERPL W P-5'-P-CCNC: 61 U/L (ref 5–45)
BASOPHILS # BLD AUTO: 0.07 THOUSANDS/ΜL (ref 0–0.1)
BASOPHILS NFR BLD AUTO: 1 % (ref 0–1)
BILIRUB SERPL-MCNC: 1.1 MG/DL (ref 0.2–1)
BUN SERPL-MCNC: 19 MG/DL (ref 5–25)
CALCIUM SERPL-MCNC: 9.2 MG/DL (ref 8.3–10.1)
CHLORIDE SERPL-SCNC: 102 MMOL/L (ref 100–108)
CHOLEST SERPL-MCNC: 162 MG/DL (ref 50–200)
CO2 SERPL-SCNC: 25 MMOL/L (ref 21–32)
CREAT SERPL-MCNC: 1.22 MG/DL (ref 0.6–1.3)
EOSINOPHIL # BLD AUTO: 0.09 THOUSAND/ΜL (ref 0–0.61)
EOSINOPHIL NFR BLD AUTO: 1 % (ref 0–6)
ERYTHROCYTE [DISTWIDTH] IN BLOOD BY AUTOMATED COUNT: 11.8 % (ref 11.6–15.1)
EST. AVERAGE GLUCOSE BLD GHB EST-MCNC: 123 MG/DL
GFR SERPL CREATININE-BSD FRML MDRD: 60 ML/MIN/1.73SQ M
GLUCOSE P FAST SERPL-MCNC: 120 MG/DL (ref 65–99)
HBA1C MFR BLD: 5.9 %
HBV SURFACE AB SER-ACNC: 4.3 MIU/ML
HBV SURFACE AG SER QL: NORMAL
HCT VFR BLD AUTO: 44.4 % (ref 36.5–49.3)
HCV AB SER QL: NORMAL
HDLC SERPL-MCNC: 51 MG/DL
HGB BLD-MCNC: 14.8 G/DL (ref 12–17)
IMM GRANULOCYTES # BLD AUTO: 0.03 THOUSAND/UL (ref 0–0.2)
IMM GRANULOCYTES NFR BLD AUTO: 0 % (ref 0–2)
LDLC SERPL CALC-MCNC: 95 MG/DL (ref 0–100)
LYMPHOCYTES # BLD AUTO: 1.37 THOUSANDS/ΜL (ref 0.6–4.47)
LYMPHOCYTES NFR BLD AUTO: 19 % (ref 14–44)
MCH RBC QN AUTO: 33.6 PG (ref 26.8–34.3)
MCHC RBC AUTO-ENTMCNC: 33.3 G/DL (ref 31.4–37.4)
MCV RBC AUTO: 101 FL (ref 82–98)
MONOCYTES # BLD AUTO: 0.67 THOUSAND/ΜL (ref 0.17–1.22)
MONOCYTES NFR BLD AUTO: 9 % (ref 4–12)
NEUTROPHILS # BLD AUTO: 5.06 THOUSANDS/ΜL (ref 1.85–7.62)
NEUTS SEG NFR BLD AUTO: 70 % (ref 43–75)
NONHDLC SERPL-MCNC: 111 MG/DL
NRBC BLD AUTO-RTO: 0 /100 WBCS
PLATELET # BLD AUTO: 327 THOUSANDS/UL (ref 149–390)
PMV BLD AUTO: 9.8 FL (ref 8.9–12.7)
POTASSIUM SERPL-SCNC: 4.3 MMOL/L (ref 3.5–5.3)
PROT SERPL-MCNC: 8.1 G/DL (ref 6.4–8.2)
RBC # BLD AUTO: 4.4 MILLION/UL (ref 3.88–5.62)
SODIUM SERPL-SCNC: 138 MMOL/L (ref 136–145)
TRIGL SERPL-MCNC: 82 MG/DL
TSH SERPL DL<=0.05 MIU/L-ACNC: 1.82 UIU/ML (ref 0.36–3.74)
WBC # BLD AUTO: 7.29 THOUSAND/UL (ref 4.31–10.16)

## 2020-06-30 PROCEDURE — 80061 LIPID PANEL: CPT

## 2020-06-30 PROCEDURE — 80053 COMPREHEN METABOLIC PANEL: CPT

## 2020-06-30 PROCEDURE — 36415 COLL VENOUS BLD VENIPUNCTURE: CPT

## 2020-06-30 PROCEDURE — 85025 COMPLETE CBC W/AUTO DIFF WBC: CPT

## 2020-06-30 PROCEDURE — 84443 ASSAY THYROID STIM HORMONE: CPT

## 2020-06-30 PROCEDURE — 87340 HEPATITIS B SURFACE AG IA: CPT

## 2020-06-30 PROCEDURE — 83036 HEMOGLOBIN GLYCOSYLATED A1C: CPT | Performed by: FAMILY MEDICINE

## 2020-06-30 PROCEDURE — 86706 HEP B SURFACE ANTIBODY: CPT

## 2020-06-30 PROCEDURE — 86803 HEPATITIS C AB TEST: CPT

## 2020-07-01 ENCOUNTER — TELEPHONE (OUTPATIENT)
Dept: FAMILY MEDICINE CLINIC | Facility: CLINIC | Age: 70
End: 2020-07-01

## 2020-07-01 NOTE — TELEPHONE ENCOUNTER
Please call patient and schedule a virtual f/u appt with Dr Julitea Pineda within the next 4 weeks, to go over lab results, thank you

## 2020-07-20 ENCOUNTER — OFFICE VISIT (OUTPATIENT)
Dept: FAMILY MEDICINE CLINIC | Facility: CLINIC | Age: 70
End: 2020-07-20
Payer: MEDICARE

## 2020-07-20 VITALS
HEART RATE: 100 BPM | HEIGHT: 71 IN | TEMPERATURE: 99 F | RESPIRATION RATE: 18 BRPM | BODY MASS INDEX: 26.88 KG/M2 | OXYGEN SATURATION: 96 % | SYSTOLIC BLOOD PRESSURE: 114 MMHG | WEIGHT: 192 LBS | DIASTOLIC BLOOD PRESSURE: 78 MMHG

## 2020-07-20 DIAGNOSIS — M54.50 CHRONIC BILATERAL LOW BACK PAIN WITHOUT SCIATICA: ICD-10-CM

## 2020-07-20 DIAGNOSIS — I10 ESSENTIAL HYPERTENSION: Primary | ICD-10-CM

## 2020-07-20 DIAGNOSIS — R73.03 PREDIABETES: ICD-10-CM

## 2020-07-20 DIAGNOSIS — K76.0 FATTY LIVER: ICD-10-CM

## 2020-07-20 DIAGNOSIS — F41.1 GENERALIZED ANXIETY DISORDER: ICD-10-CM

## 2020-07-20 DIAGNOSIS — G89.29 CHRONIC BILATERAL LOW BACK PAIN WITHOUT SCIATICA: ICD-10-CM

## 2020-07-20 PROCEDURE — 3008F BODY MASS INDEX DOCD: CPT | Performed by: FAMILY MEDICINE

## 2020-07-20 PROCEDURE — 99214 OFFICE O/P EST MOD 30 MIN: CPT | Performed by: FAMILY MEDICINE

## 2020-07-20 PROCEDURE — 1036F TOBACCO NON-USER: CPT | Performed by: FAMILY MEDICINE

## 2020-07-20 PROCEDURE — 1160F RVW MEDS BY RX/DR IN RCRD: CPT | Performed by: FAMILY MEDICINE

## 2020-07-20 PROCEDURE — 3074F SYST BP LT 130 MM HG: CPT | Performed by: FAMILY MEDICINE

## 2020-07-20 PROCEDURE — 4040F PNEUMOC VAC/ADMIN/RCVD: CPT | Performed by: FAMILY MEDICINE

## 2020-07-20 PROCEDURE — 3078F DIAST BP <80 MM HG: CPT | Performed by: FAMILY MEDICINE

## 2020-07-20 NOTE — PROGRESS NOTES
Subjective:           Problem List Items Addressed This Visit        Cardiovascular and Mediastinum    Essential hypertension - Primary stable cont meds adjust down if pressures low    Relevant Orders    Comprehensive metabolic panel    Lipid panel       Other    Generalized anxiety disorder improved cont current dose    Chronic bilateral low back pain without sciatica    Prediabetes    Relevant Orders    Comprehensive metabolic panel    Lipid panel      Other Visit Diagnoses     Fatty liver     Low fat low carb diet    Relevant Orders    Lipid panel              Orders Placed This Encounter   Procedures    Comprehensive metabolic panel     This is a patient instruction: Patient fasting for 8 hours or longer recommended  Standing Status:   Future     Standing Expiration Date:   7/20/2021    Lipid panel     This is a patient instruction: This test requires patient fasting for 10-12 hours or longer  Drinking of black coffee or black tea is acceptable  Standing Status:   Future     Standing Expiration Date:   7/20/2021       Patient Instructions     Low-carbohydrate diet routine screening ocular exam is continue medication counseling as needed or as discussed follow-up annual wellness visit stay current with screenings labs as ordered    Recent Results (from the past 1344 hour(s))   Hemoglobin A1C    Collection Time: 06/30/20  7:30 AM   Result Value Ref Range    Hemoglobin A1C 5 9 (H) Normal 3 8-5 6%; PreDiabetic 5 7-6 4%;  Diabetic >=6 5%; Glycemic control for adults with diabetes <7 0% %     mg/dl   Lipid panel    Collection Time: 06/30/20  7:30 AM   Result Value Ref Range    Cholesterol 162 50 - 200 mg/dL    Triglycerides 82 <=150 mg/dL    HDL, Direct 51 >=40 mg/dL    LDL Calculated 95 0 - 100 mg/dL    Non-HDL-Chol (CHOL-HDL) 111 mg/dl   Comprehensive metabolic panel    Collection Time: 06/30/20  7:30 AM   Result Value Ref Range    Sodium 138 136 - 145 mmol/L    Potassium 4 3 3 5 - 5 3 mmol/L Chloride 102 100 - 108 mmol/L    CO2 25 21 - 32 mmol/L    ANION GAP 11 4 - 13 mmol/L    BUN 19 5 - 25 mg/dL    Creatinine 1 22 0 60 - 1 30 mg/dL    Glucose, Fasting 120 (H) 65 - 99 mg/dL    Calcium 9 2 8 3 - 10 1 mg/dL    AST 61 (H) 5 - 45 U/L     (H) 12 - 78 U/L    Alkaline Phosphatase 67 46 - 116 U/L    Total Protein 8 1 6 4 - 8 2 g/dL    Albumin 4 0 3 5 - 5 0 g/dL    Total Bilirubin 1 10 (H) 0 20 - 1 00 mg/dL    eGFR 60 ml/min/1 73sq m   Hepatitis C antibody    Collection Time: 06/30/20  7:30 AM   Result Value Ref Range    Hepatitis C Ab Non-reactive Non-reactive   Hepatitis B surface antibody    Collection Time: 06/30/20  7:30 AM   Result Value Ref Range    Hep B S Ab 4 30 mIU/mL   Hepatitis B surface antigen    Collection Time: 06/30/20  7:30 AM   Result Value Ref Range    Hepatitis B Surface Ag Non-reactive Non-reactive, NonReactive - Confirmed   CBC and differential    Collection Time: 06/30/20  7:30 AM   Result Value Ref Range    WBC 7 29 4 31 - 10 16 Thousand/uL    RBC 4 40 3 88 - 5 62 Million/uL    Hemoglobin 14 8 12 0 - 17 0 g/dL    Hematocrit 44 4 36 5 - 49 3 %     (H) 82 - 98 fL    MCH 33 6 26 8 - 34 3 pg    MCHC 33 3 31 4 - 37 4 g/dL    RDW 11 8 11 6 - 15 1 %    MPV 9 8 8 9 - 12 7 fL    Platelets 420 234 - 517 Thousands/uL    nRBC 0 /100 WBCs    Neutrophils Relative 70 43 - 75 %    Immat GRANS % 0 0 - 2 %    Lymphocytes Relative 19 14 - 44 %    Monocytes Relative 9 4 - 12 %    Eosinophils Relative 1 0 - 6 %    Basophils Relative 1 0 - 1 %    Neutrophils Absolute 5 06 1 85 - 7 62 Thousands/µL    Immature Grans Absolute 0 03 0 00 - 0 20 Thousand/uL    Lymphocytes Absolute 1 37 0 60 - 4 47 Thousands/µL    Monocytes Absolute 0 67 0 17 - 1 22 Thousand/µL    Eosinophils Absolute 0 09 0 00 - 0 61 Thousand/µL    Basophils Absolute 0 07 0 00 - 0 10 Thousands/µL   TSH, 3rd generation with Free T4 reflex    Collection Time: 06/30/20  7:30 AM   Result Value Ref Range    TSH 3RD [facility] 1 819 0 358 - 3 740 uIU/mL       Low fat low carb diet    BMI Counseling: Body mass index is 26 78 kg/m²  Discussed the patient's BMI with him  The [address]    Chief Complaint   Patient presents with    Anxiety     follow up on change of medication  says he's feeling very good  HPI  Isidra Lima is in for follow-up history of hypertension anxiety depression hyperlipidemia COPD on inhaler therapy  He is on Effexor currently which he has done well with in the past   Recheck his blood pressure  He has mild blood sugar elevation last A1c less than 6 he has mild liver function abnormalities will follow most likely fatty infiltration as evidence on last CT scan he does have renal stones       Doin much better on all symtoms  /78 (BP Location: Left arm, Patient Position: Sitting, Cuff Size: Standard)   Pulse 100   Temp 99 °F (37 2 °C) (Tympanic)   Resp 18   Ht 5' 11" (1 803 m)   Wt 87 1 kg (192 lb)   SpO2 96%   BMI 26 78 kg/m²       Allergies   Allergen Reactions    Amoxicillin     Dust Mite Extract     Other     Oxycodone        Current Outpatient Medications on File Prior to Visit   Medication Sig Dispense Refill    albuterol (VENTOLIN HFA) 90 mcg/act inhaler Inhale 2 puffs every 6 (six) hours as needed for wheezing 1 Inhaler 0    amLODIPine (NORVASC) 5 mg tablet TAKE ONE TABLET BY MOUTH EVERY DAY 90 tablet 3    Blood Pressure Monitoring (BLOOD PRESSURE CUFF) MISC Check BP a few times a week 1 each 0    fluticasone (FLONASE) 50 mcg/act nasal spray 2 sprays into each nostril daily 16 g 0    lisinopril (ZESTRIL) 40 mg tablet TAKE ONE TABLET BY MOUTH EVERY DAY 90 tablet 3    LORazepam (ATIVAN) 0 5 mg tablet Take 1 tablet (0 5 mg total) by mouth daily 30 tablet 0    venlafaxine (EFFEXOR-XR) 75 mg 24 hr capsule Take 1 capsule (75 mg total) by mouth daily with breakfast 90 capsule 3     No current facility-administered medications on file prior to visit          Past Medical History:   Diagnosis Date    Acute non-recurrent pansinusitis 12/17/2018    Arthritis     Asthma     Concussion 12/16/2013    Contact dermatitis 7/17/2018    COPD [facility]chronic obstructive pulmonary disease) ([facility] [address]     Depression     Erectile dysfunction 2/20/2017    Hiatal hernia     Hyperlipidemia     Hypertension     Neck pain 12/19/2013    Open wound of scalp 12/16/2013    Pain in joint of right wrist 12/16/2013    Palpitations 9/4/2012    Procedure/Onset: 02/15/2008    Postconcussion syndrome 12/19/2013    Sinus trouble     Ventilation pneumonitis ([facility] [address] [facility] 12/16/2013       Past Surgical History:   Procedure Laterality Date    HERNIA REPAIR            reports that he has never smoked  He has never used smokeless tobacco  He reports that he drinks alcohol  He reports that he does not use drugs  reports that he has never smoked  He has never used smokeless tobacco         Review of Systems   Constitutional: Negative for appetite change, fatigue and fever  HENT: Negative for congestion, drooling, nosebleeds and sore throat  Respiratory: Negative for apnea, cough, chest tightness and wheezing  Cardiovascular: Negative for chest pain and palpitations  Gastrointestinal: Negative for abdominal distention and nausea  Genitourinary: Negative for decreased urine volume, difficulty urinating and hematuria  Musculoskeletal: Negative for arthralgias and neck stiffness  Skin: Negative for rash  Neurological: Negative for dizziness and light-headedness  Comes and goes   Psychiatric/Behavioral: Positive for agitation  Negative for confusion, decreased concentration, dysphoric mood, hallucinations, self-injury, sleep disturbance and suicidal ideas  The patient is not nervous/anxious and is not hyperactive  All improved       Physical Exam   Constitutional: He is oriented to person, place, and time  He appears well-developed and well-nourished  No distress  HENT:   Head: Normocephalic and atraumatic  Eyes: Pupils are equal, round, and reactive to light  No scleral icterus  Neck: Normal range of motion  Pulmonary/Chest: Effort normal and breath sounds normal  He has no wheezes  Abdominal: Bowel sounds are normal  He exhibits no distension  Musculoskeletal: Normal range of motion  He exhibits no tenderness  Lymphadenopathy:     He has no cervical adenopathy  Neurological: He is alert and oriented to person, place, and time  He displays normal reflexes  No cranial nerve deficit or sensory deficit  He exhibits normal muscle tone  Coordination normal    Skin: Skin is warm and dry  Capillary refill takes less than 2 seconds  No erythema  Psychiatric: He has a normal mood and affect   His behavior is normal  Judgment and thought content normal    Mild anxiety

## 2020-07-20 NOTE — PATIENT INSTRUCTIONS
Low-carbohydrate diet routine screening ocular exam is continue medication counseling as needed or as discussed follow-up annual wellness visit stay current with screenings labs as ordered    Recent Results (from the past 1344 hour(s))   Hemoglobin A1C    Collection Time: 06/30/20  7:30 AM   Result Value Ref Range    Hemoglobin A1C 5 9 (H) Normal 3 8-5 6%; PreDiabetic 5 7-6 4%;  Diabetic >=6 5%; Glycemic control for adults with diabetes <7 0% %     mg/dl   Lipid panel    Collection Time: 06/30/20  7:30 AM   Result Value Ref Range    Cholesterol 162 50 - 200 mg/dL    Triglycerides 82 <=150 mg/dL    HDL, Direct 51 >=40 mg/dL    LDL Calculated 95 0 - 100 mg/dL    Non-HDL-Chol (CHOL-HDL) 111 mg/dl   Comprehensive metabolic panel    Collection Time: 06/30/20  7:30 AM   Result Value Ref Range    Sodium 138 136 - 145 mmol/L    Potassium 4 3 3 5 - 5 3 mmol/L    Chloride 102 100 - 108 mmol/L    CO2 25 21 - 32 mmol/L    ANION GAP 11 4 - 13 mmol/L    BUN 19 5 - 25 mg/dL    Creatinine 1 22 0 60 - 1 30 mg/dL    Glucose, Fasting 120 (H) 65 - 99 mg/dL    Calcium 9 2 8 3 - 10 1 mg/dL    AST 61 (H) 5 - 45 U/L     (H) 12 - 78 U/L    Alkaline Phosphatase 67 46 - 116 U/L    Total Protein 8 1 6 4 - 8 2 g/dL    Albumin 4 0 3 5 - 5 0 g/dL    Total Bilirubin 1 10 (H) 0 20 - 1 00 mg/dL    eGFR 60 ml/min/1 73sq m   Hepatitis C antibody    Collection Time: 06/30/20  7:30 AM   Result Value Ref Range    Hepatitis C Ab Non-reactive Non-reactive   Hepatitis B surface antibody    Collection Time: 06/30/20  7:30 AM   Result Value Ref Range    Hep B S Ab 4 30 mIU/mL   Hepatitis B surface antigen    Collection Time: 06/30/20  7:30 AM   Result Value Ref Range    Hepatitis B Surface Ag Non-reactive Non-reactive, NonReactive - Confirmed   CBC and differential    Collection Time: 06/30/20  7:30 AM   Result Value Ref Range    WBC 7 29 4 31 - 10 16 Thousand/uL    RBC 4 40 3 88 - 5 62 Million/uL    Hemoglobin 14 8 12 0 - 17 0 g/dL    Hematocrit 44 4 36 5 - 49 3 %     (H) 82 - 98 fL    MCH 33 6 26 8 - 34 3 pg    MCHC 33 3 31 4 - 37 4 g/dL    RDW 11 8 11 6 - 15 1 %    MPV 9 8 8 9 - 12 7 fL    Platelets 186 165 - 692 Thousands/uL    nRBC 0 /100 WBCs    Neutrophils Relative 70 43 - 75 %    Immat GRANS % 0 0 - 2 %    Lymphocytes Relative 19 14 - 44 %    Monocytes Relative 9 4 - 12 %    Eosinophils Relative 1 0 - 6 %    Basophils Relative 1 0 - 1 %    Neutrophils Absolute 5 06 1 85 - 7 62 Thousands/µL    Immature Grans Absolute 0 03 0 00 - 0 20 Thousand/uL    Lymphocytes Absolute 1 37 0 60 - 4 47 Thousands/µL    Monocytes Absolute 0 67 0 17 - 1 22 Thousand/µL    Eosinophils Absolute 0 09 0 00 - 0 61 Thousand/µL    Basophils Absolute 0 07 0 00 - 0 10 Thousands/µL   TSH, 3rd generation with Free T4 reflex    Collection Time: 06/30/20  7:30 AM   Result Value Ref Range    TSH 3RD GENERATON 1 819 0 358 - 3 740 uIU/mL       Low fat low carb diet

## 2021-05-28 DIAGNOSIS — I10 ESSENTIAL HYPERTENSION: ICD-10-CM

## 2021-05-28 RX ORDER — LISINOPRIL 40 MG/1
TABLET ORAL
Qty: 90 TABLET | Refills: 3 | Status: SHIPPED | OUTPATIENT
Start: 2021-05-28 | End: 2022-05-19 | Stop reason: SDUPTHER

## 2021-06-01 ENCOUNTER — TELEPHONE (OUTPATIENT)
Dept: FAMILY MEDICINE CLINIC | Facility: CLINIC | Age: 71
End: 2021-06-01

## 2021-06-01 NOTE — TELEPHONE ENCOUNTER
Left patient a VM asking to please call the office back to set up an apt  If patient calls the office please refer to message below  Thank you!!     ----- Message from Zo Floyd MD sent at 5/28/2021  4:55 PM EDT -----  Regarding: Please schedule patient for Medicare Annual Wellness Visit  Please schedule patient for Medicare Annual Wellness Visit within the next 8 weeks   Thanks, Dr Raffy Gonzalez

## 2021-07-31 DIAGNOSIS — F41.1 GENERALIZED ANXIETY DISORDER: ICD-10-CM

## 2021-08-01 RX ORDER — VENLAFAXINE HYDROCHLORIDE 75 MG/1
CAPSULE, EXTENDED RELEASE ORAL
Qty: 90 CAPSULE | Refills: 3 | Status: SHIPPED | OUTPATIENT
Start: 2021-08-01 | End: 2022-05-19 | Stop reason: SDUPTHER

## 2021-08-09 DIAGNOSIS — I10 ESSENTIAL HYPERTENSION: ICD-10-CM

## 2021-08-09 RX ORDER — AMLODIPINE BESYLATE 5 MG/1
5 TABLET ORAL DAILY
Qty: 90 TABLET | Refills: 3 | Status: SHIPPED | OUTPATIENT
Start: 2021-08-09 | End: 2022-05-19 | Stop reason: SDUPTHER

## 2021-08-12 ENCOUNTER — RA CDI HCC (OUTPATIENT)
Dept: OTHER | Facility: HOSPITAL | Age: 71
End: 2021-08-12

## 2021-08-12 NOTE — PROGRESS NOTES
[facility] 75  coding opportunities       Chart reviewed, no opportunity found: CHART REVIEWED, NO OPPORTUNITY FOUND                        Patients insurance company: [employer]

## 2021-08-18 ENCOUNTER — OFFICE VISIT (OUTPATIENT)
Dept: FAMILY MEDICINE CLINIC | Facility: CLINIC | Age: 71
End: 2021-08-18
Payer: MEDICARE

## 2021-08-18 VITALS
HEIGHT: 71 IN | OXYGEN SATURATION: 97 % | TEMPERATURE: 97.2 F | RESPIRATION RATE: 18 BRPM | WEIGHT: 191 LBS | SYSTOLIC BLOOD PRESSURE: 120 MMHG | HEART RATE: 91 BPM | BODY MASS INDEX: 26.74 KG/M2 | DIASTOLIC BLOOD PRESSURE: 80 MMHG

## 2021-08-18 DIAGNOSIS — M54.50 CHRONIC BILATERAL LOW BACK PAIN WITHOUT SCIATICA: ICD-10-CM

## 2021-08-18 DIAGNOSIS — F43.11 ACUTE POST-TRAUMATIC STRESS DISORDER: ICD-10-CM

## 2021-08-18 DIAGNOSIS — F51.04 PSYCHOPHYSIOLOGICAL INSOMNIA: ICD-10-CM

## 2021-08-18 DIAGNOSIS — R06.83 SNORING: Primary | ICD-10-CM

## 2021-08-18 DIAGNOSIS — F10.11 HISTORY OF ALCOHOL ABUSE: ICD-10-CM

## 2021-08-18 DIAGNOSIS — G89.29 CHRONIC BILATERAL LOW BACK PAIN WITHOUT SCIATICA: ICD-10-CM

## 2021-08-18 DIAGNOSIS — E78.2 MIXED HYPERLIPIDEMIA: ICD-10-CM

## 2021-08-18 DIAGNOSIS — F41.1 GENERALIZED ANXIETY DISORDER: ICD-10-CM

## 2021-08-18 DIAGNOSIS — R73.03 PREDIABETES: ICD-10-CM

## 2021-08-18 PROCEDURE — 1123F ACP DISCUSS/DSCN MKR DOCD: CPT | Performed by: FAMILY MEDICINE

## 2021-08-18 PROCEDURE — G0439 PPPS, SUBSEQ VISIT: HCPCS | Performed by: FAMILY MEDICINE

## 2021-08-18 RX ORDER — LORAZEPAM 0.5 MG/1
0.5 TABLET ORAL DAILY
Qty: 30 TABLET | Refills: 0
Start: 2021-08-18 | End: 2022-05-19 | Stop reason: SDUPTHER

## 2021-08-18 NOTE — PROGRESS NOTES
Last Medicare Wellness visit information reviewed, patient interviewed and updates made to the record today  Health Risk Assessment:   Patient rates overall health as very good  Patient feels that their physical health rating is much better  Patient is satisfied with their life  Eyesight was rated as same  Hearing was rated as same  Patient feels that their emotional and mental health rating is same  Patients states they are never, rarely angry  Patient states they are often unusually tired/fatigued  Pain experienced in the last 7 days has been some  Patient's pain rating has been 4/10  Patient states that he has experienced no weight loss or gain in last 6 months  Depression Screening:   PHQ-2 Score: 0      Fall Risk Screening: In the past year, patient has experienced: no history of falling in past year      Home Safety:  Patient does not have trouble with stairs inside or outside of their home  Patient has working smoke alarms and has working carbon monoxide detector  Home safety hazards include: none  Nutrition:   Current diet is Regular  Medications:   Patient is not currently taking any over-the-counter supplements  Patient is able to manage medications  Activities of Daily Living (ADLs)/Instrumental Activities of Daily Living (IADLs):   Walk and transfer into and out of bed and chair?: Yes  Dress and groom yourself?: Yes    Bathe or shower yourself?: Yes    Feed yourself? Yes  Do your laundry/housekeeping?: Yes  Manage your money, pay your bills and track your expenses?: Yes  Make your own meals?: Yes    Do your own shopping?: Yes    Durable Medical Equipment Suppliers  none    Previous Hospitalizations:   Any hospitalizations or ED visits within the last 12 months?: No      Advance Care Planning:   Living will: Yes    Durable POA for healthcare: Yes    Advanced directive: Yes    Advanced directive counseling given: Yes    Five wishes given: No    Patient declined ACP directive:  Yes End of Life Decisions reviewed with patient: Yes    Provider agrees with end of life decisions: No      Comments: Mary Cook wife is POA  Cognitive Screening:   Provider or family/friend/caregiver concerned regarding cognition?: No    PREVENTIVE SCREENINGS      Cardiovascular Screening:    General: Screening Not Indicated and History Lipid Disorder      Abdominal Aortic Aneurysm (AAA) Screening:    Risk factors include: age between 73-67 yo        Lung Cancer Screening:     General: Screening Not Indicated      Hepatitis C Screening:    General: Screening Current    Screening, Brief Intervention, and Referral to Treatment (SBIRT)    Screening  Typical number of drinks in a day: 1  Typical number of drinks in a week: 3  Interpretation: Low risk drinking behavior

## 2021-08-18 NOTE — PROGRESS NOTES
BMI Counseling: Body mass index is 26 64 kg/m²  The BMI is above normal  Nutrition recommendations include encouraging healthy choices of fruits and vegetables, decreasing fast food intake and consuming healthier snacks  AWV reviewed with pt  I advise avoid all alcohol ideally  Substitute water,  non-alcoholic beer  Works a few part time jobs  Had COVID vaccine  5 wishes given    Home BP ok  Overall feels well  Does wake up anxious and has to 'force himself to get out of bed  Relies then on 1/2 of lorazepam  Feels better once engaged with life  That is why now does some jobs  Ok to take an ativan OCCASIONALLY

## 2021-08-18 NOTE — PATIENT INSTRUCTIONS
How to manage stress & anxiety    Occasionally feeling stressed or  anxiety is a normal part of the human experience, however if it becomes too intense or pervasive it can reduce enjoyment of life, interfere with health and one's relationships and  impair work performance  It has been said that this is the 'age of anxiety' - aggravated by the hectic pace, artificial environments and excess demands of modern life  Maintain a balanced lifestyle including adequate rest & sleep, a plant-based whole foods diet and regular exercise,  healthy relationships and some fun    Ensure balance in life's opposite roles: Work vs   Play  Many people simply take on too much  If possible, avoid taking on new responsibilities when suffering from anxiety  Learn to say 'no' to new obligations if one feels over-loaded  Intellectual vs  Physical activities  Exercise is a great stress reliever  Serious endeavors vs  Fun  One day a week dedicated to rest and renewal can do wonders for one's mental state  Mindfulness  A main  of anxiety is identifying too closely with one's thoughts and emotions, as if they are our entire being  The realization that we are not our thoughts and emotions, but rather that they are just a part of us can help free us from this mind trap  By developing the ability to just observe one's thoughts and emotions come and go without getting swept away by them nor trying to forcefully control or repress them, our larger mind will naturally find a place of calm and centeredness  A daily practice of meditation can help support this process, even if for only 10 minutes a day  Meditation apps such as [employer] are useful, and have modules that focus on letting go of anxiety  Breath-work, whether in the context of meditation or not, can be helpful for stress-relief  It can be helpful even if done for 5 minutes    As we slowly exhale, in particular, it helps the body relax (stimulates the parasympathetic system)  One way to do it is as follows:  ? Find a quiet spot, and get comfortable  ? Let your mind go blank  ? Inhale for 4 seconds, hold for 7 seconds, and then exhale for 8 seconds  ? Do this for 5 minutes, focusing on just relaxing    If holding your breath is difficult, you can just focus on the inhalation and exhalation remembering to exhale twice as long as you inhale  Biofeedback  such as HeartMath ([employer]) devices that entrain heart rate variability can be very helpful  Relaxation online and CD resources    Guided imagery is a way of harnessing the creative power of your own mind to help heal  What's great is that it is fun and generally has no significant side effects, yet is remarkably effective for a number of health issues such as anxiety when practiced regularly  Here is a free web site on guided imagery pod casts developed by a national expert to help you get started  Download the pod cast of your choice and practice regularly - over time you can expect significant improvement  Enjoy!    https://healthy [facility] org/health/care/!ut/p/a0/[id number]!/    Copy and paste the above URL Internet address into your computer Internet browser  Then bookmark the site so you can return to it easily  (If for any reason the above web site does not work, go to [email or web address] [facility]  org, click on the 'Health and Wellness' tab, then select the  'Live Healthy' tab and then select 'Podcasts (Guided imagery)' under 'Videos and podcasts' towards the bottom of the list on the left side of the screen)  https://www Jetpac/  [place]ton/  Dr Romi Rivero mindfulness meditation tapes    [employer] com  com/pmr htm  Guide to progressive muscle relaxation    https://marion-valencia info/  5 Yoga poses for stress management    Owen claudio  Connecting students with teachers in the area     www youtube  com  Search for meditation, relaxation, guided visualization or yoga  Keep sifting through until you find what you like and then write it down/ bookmark it so you can revisit  https://huntPlayPhone/  [facility]/files/web-uploads/documents/outreach/im/module_meditation_patient  pdf  Handout on types of meditation    CDs:  Breathing: the Master Key to Self-Healing Lokesh Juarez)  Relieve Anxiety Navarro Rear), also Deep Sleep James Backer)    Avoid stimulants such as:   Mental/Emotional: Horror/action movies, Media news of crimes    Avoid/resolve angry or conflicted relationships    Medications: Decongestants such as phenylephrine, appetite suppressants, methylphenidate and other drugs to treat ADHD, migraine meds that contain caffeine such as Excedrin, albuterol, corticosteroids (check with your doctor first of course)    Eliminate all caffeine: coffee, strong tea, sodas, energy drinks  Avoid stimulating herbs & supplements such as yerba mate, guarana, high dose SAMe      Medications and Psychotherapy are helpful for many people suffering from anxiety - discuss these options with your doctor

## 2021-11-04 ENCOUNTER — TELEPHONE (OUTPATIENT)
Dept: FAMILY MEDICINE CLINIC | Facility: CLINIC | Age: 71
End: 2021-11-04

## 2021-11-04 DIAGNOSIS — E78.2 MIXED HYPERLIPIDEMIA: ICD-10-CM

## 2021-11-04 DIAGNOSIS — I10 ESSENTIAL HYPERTENSION: Primary | ICD-10-CM

## 2021-11-04 DIAGNOSIS — N42.9 DISORDER OF PROSTATE, UNSPECIFIED: ICD-10-CM

## 2021-11-04 DIAGNOSIS — F10.11 HISTORY OF ALCOHOL ABUSE: ICD-10-CM

## 2021-11-04 DIAGNOSIS — R73.03 PREDIABETES: ICD-10-CM

## 2021-11-04 DIAGNOSIS — Z12.5 SCREENING PSA (PROSTATE SPECIFIC ANTIGEN): ICD-10-CM

## 2021-11-05 ENCOUNTER — APPOINTMENT (OUTPATIENT)
Dept: LAB | Facility: HOSPITAL | Age: 71
End: 2021-11-05
Attending: FAMILY MEDICINE
Payer: MEDICARE

## 2021-11-05 DIAGNOSIS — I10 ESSENTIAL HYPERTENSION: ICD-10-CM

## 2021-11-05 DIAGNOSIS — E78.2 MIXED HYPERLIPIDEMIA: ICD-10-CM

## 2021-11-05 DIAGNOSIS — F10.11 HISTORY OF ALCOHOL ABUSE: ICD-10-CM

## 2021-11-05 DIAGNOSIS — N42.9 DISORDER OF PROSTATE, UNSPECIFIED: ICD-10-CM

## 2021-11-05 DIAGNOSIS — R73.03 PREDIABETES: ICD-10-CM

## 2021-11-05 DIAGNOSIS — Z12.5 SCREENING PSA (PROSTATE SPECIFIC ANTIGEN): ICD-10-CM

## 2021-11-05 LAB
ALBUMIN SERPL BCP-MCNC: 3.9 G/DL (ref 3.5–5)
ALP SERPL-CCNC: 70 U/L (ref 46–116)
ALT SERPL W P-5'-P-CCNC: 101 U/L (ref 12–78)
ANION GAP SERPL CALCULATED.3IONS-SCNC: 7 MMOL/L (ref 4–13)
AST SERPL W P-5'-P-CCNC: 52 U/L (ref 5–45)
BILIRUB SERPL-MCNC: 0.98 MG/DL (ref 0.2–1)
BUN SERPL-MCNC: 17 MG/DL (ref 5–25)
CALCIUM SERPL-MCNC: 9.6 MG/DL (ref 8.3–10.1)
CHLORIDE SERPL-SCNC: 107 MMOL/L (ref 100–108)
CHOLEST SERPL-MCNC: 217 MG/DL (ref 50–200)
CO2 SERPL-SCNC: 30 MMOL/L (ref 21–32)
CREAT SERPL-MCNC: 1.12 MG/DL (ref 0.6–1.3)
EST. AVERAGE GLUCOSE BLD GHB EST-MCNC: 117 MG/DL
GFR SERPL CREATININE-BSD FRML MDRD: 66 ML/MIN/1.73SQ M
GLUCOSE P FAST SERPL-MCNC: 120 MG/DL (ref 65–99)
HBA1C MFR BLD: 5.7 %
HDLC SERPL-MCNC: 43 MG/DL
LDLC SERPL CALC-MCNC: 137 MG/DL (ref 0–100)
NONHDLC SERPL-MCNC: 174 MG/DL
POTASSIUM SERPL-SCNC: 4.4 MMOL/L (ref 3.5–5.3)
PROT SERPL-MCNC: 8.2 G/DL (ref 6.4–8.2)
SODIUM SERPL-SCNC: 144 MMOL/L (ref 136–145)
TRIGL SERPL-MCNC: 187 MG/DL

## 2021-11-05 PROCEDURE — 36415 COLL VENOUS BLD VENIPUNCTURE: CPT

## 2021-11-05 PROCEDURE — 80053 COMPREHEN METABOLIC PANEL: CPT

## 2021-11-05 PROCEDURE — 84153 ASSAY OF PSA TOTAL: CPT

## 2021-11-05 PROCEDURE — 80061 LIPID PANEL: CPT

## 2021-11-05 PROCEDURE — 84154 ASSAY OF PSA FREE: CPT

## 2021-11-05 PROCEDURE — 83036 HEMOGLOBIN GLYCOSYLATED A1C: CPT

## 2021-11-06 LAB
PSA FREE MFR SERPL: 45 %
PSA FREE SERPL-MCNC: 0.18 NG/ML
PSA SERPL-MCNC: 0.4 NG/ML (ref 0–4)

## 2021-11-10 ENCOUNTER — OFFICE VISIT (OUTPATIENT)
Dept: FAMILY MEDICINE CLINIC | Facility: CLINIC | Age: 71
End: 2021-11-10
Payer: MEDICARE

## 2021-11-10 VITALS
SYSTOLIC BLOOD PRESSURE: 128 MMHG | TEMPERATURE: 98.4 F | RESPIRATION RATE: 18 BRPM | OXYGEN SATURATION: 98 % | DIASTOLIC BLOOD PRESSURE: 78 MMHG | HEART RATE: 83 BPM | WEIGHT: 201 LBS | HEIGHT: 71 IN | BODY MASS INDEX: 28.14 KG/M2

## 2021-11-10 DIAGNOSIS — R73.03 PREDIABETES: ICD-10-CM

## 2021-11-10 DIAGNOSIS — N40.1 BENIGN PROSTATIC HYPERPLASIA WITH URINARY FREQUENCY: ICD-10-CM

## 2021-11-10 DIAGNOSIS — K76.0 HEPATIC STEATOSIS: Primary | ICD-10-CM

## 2021-11-10 DIAGNOSIS — F51.04 PSYCHOPHYSIOLOGICAL INSOMNIA: ICD-10-CM

## 2021-11-10 DIAGNOSIS — F41.1 GENERALIZED ANXIETY DISORDER: ICD-10-CM

## 2021-11-10 DIAGNOSIS — J45.20 MILD INTERMITTENT ASTHMA WITHOUT COMPLICATION: ICD-10-CM

## 2021-11-10 DIAGNOSIS — F10.11 HISTORY OF ALCOHOL ABUSE: ICD-10-CM

## 2021-11-10 DIAGNOSIS — I10 ESSENTIAL HYPERTENSION: ICD-10-CM

## 2021-11-10 DIAGNOSIS — E78.2 MIXED HYPERLIPIDEMIA: ICD-10-CM

## 2021-11-10 DIAGNOSIS — R35.0 BENIGN PROSTATIC HYPERPLASIA WITH URINARY FREQUENCY: ICD-10-CM

## 2021-11-10 DIAGNOSIS — K21.9 GASTROESOPHAGEAL REFLUX DISEASE WITHOUT ESOPHAGITIS: ICD-10-CM

## 2021-11-10 PROCEDURE — 99214 OFFICE O/P EST MOD 30 MIN: CPT | Performed by: FAMILY MEDICINE

## 2021-11-10 RX ORDER — ATORVASTATIN CALCIUM 20 MG/1
20 TABLET, FILM COATED ORAL DAILY
Qty: 30 TABLET | Refills: 5 | Status: SHIPPED | OUTPATIENT
Start: 2021-11-10

## 2021-11-29 DIAGNOSIS — J43.8 OTHER EMPHYSEMA (HCC): ICD-10-CM

## 2021-12-01 RX ORDER — ALBUTEROL SULFATE 90 UG/1
2 AEROSOL, METERED RESPIRATORY (INHALATION) EVERY 6 HOURS PRN
Qty: 18 G | Refills: 1 | Status: SHIPPED | OUTPATIENT
Start: 2021-12-01

## 2022-01-03 ENCOUNTER — TELEPHONE (OUTPATIENT)
Dept: FAMILY MEDICINE CLINIC | Facility: CLINIC | Age: 72
End: 2022-01-03

## 2022-01-03 NOTE — TELEPHONE ENCOUNTER
Patient stated 4 [facility], [address] needs the okay for the patient to continue the use of his oxygen       This was all that patient stated in the referral voice mail

## 2022-05-14 DIAGNOSIS — I10 ESSENTIAL HYPERTENSION: ICD-10-CM

## 2022-05-16 RX ORDER — LISINOPRIL 40 MG/1
TABLET ORAL
Qty: 90 TABLET | Refills: 3 | OUTPATIENT
Start: 2022-05-16

## 2022-05-19 ENCOUNTER — OFFICE VISIT (OUTPATIENT)
Dept: FAMILY MEDICINE CLINIC | Facility: CLINIC | Age: 72
End: 2022-05-19
Payer: MEDICARE

## 2022-05-19 VITALS
DIASTOLIC BLOOD PRESSURE: 106 MMHG | RESPIRATION RATE: 18 BRPM | BODY MASS INDEX: 28.5 KG/M2 | OXYGEN SATURATION: 96 % | WEIGHT: 203.6 LBS | HEIGHT: 71 IN | HEART RATE: 89 BPM | SYSTOLIC BLOOD PRESSURE: 136 MMHG | TEMPERATURE: 97.1 F

## 2022-05-19 DIAGNOSIS — R19.7 DIARRHEA, UNSPECIFIED TYPE: ICD-10-CM

## 2022-05-19 DIAGNOSIS — N52.9 ERECTILE DYSFUNCTION, UNSPECIFIED ERECTILE DYSFUNCTION TYPE: ICD-10-CM

## 2022-05-19 DIAGNOSIS — R73.03 PREDIABETES: ICD-10-CM

## 2022-05-19 DIAGNOSIS — F41.1 GENERALIZED ANXIETY DISORDER: ICD-10-CM

## 2022-05-19 DIAGNOSIS — J45.20 MILD INTERMITTENT ASTHMA WITHOUT COMPLICATION: ICD-10-CM

## 2022-05-19 DIAGNOSIS — F43.11 ACUTE POST-TRAUMATIC STRESS DISORDER: ICD-10-CM

## 2022-05-19 DIAGNOSIS — I10 ESSENTIAL HYPERTENSION: Primary | ICD-10-CM

## 2022-05-19 PROCEDURE — 99214 OFFICE O/P EST MOD 30 MIN: CPT | Performed by: FAMILY MEDICINE

## 2022-05-19 RX ORDER — MEDICAL SUPPLY, MISCELLANEOUS
EACH MISCELLANEOUS
Qty: 1 EACH | Refills: 0 | Status: SHIPPED | OUTPATIENT
Start: 2022-05-19

## 2022-05-19 RX ORDER — LISINOPRIL 40 MG/1
40 TABLET ORAL DAILY
Qty: 90 TABLET | Refills: 3 | Status: SHIPPED | OUTPATIENT
Start: 2022-05-19

## 2022-05-19 RX ORDER — AMLODIPINE BESYLATE 5 MG/1
10 TABLET ORAL DAILY
Qty: 180 TABLET | Refills: 3 | Status: SHIPPED | OUTPATIENT
Start: 2022-05-19

## 2022-05-19 RX ORDER — LORAZEPAM 0.5 MG/1
0.5 TABLET ORAL DAILY
Qty: 30 TABLET | Refills: 0
Start: 2022-05-19

## 2022-05-19 RX ORDER — VENLAFAXINE HYDROCHLORIDE 75 MG/1
75 CAPSULE, EXTENDED RELEASE ORAL DAILY
Qty: 90 CAPSULE | Refills: 3 | Status: SHIPPED | OUTPATIENT
Start: 2022-05-19 | End: 2022-05-19

## 2022-05-19 RX ORDER — SILDENAFIL 50 MG/1
25 TABLET, FILM COATED ORAL DAILY PRN
Qty: 10 TABLET | Refills: 3 | Status: SHIPPED | OUTPATIENT
Start: 2022-05-19

## 2022-05-19 RX ORDER — VENLAFAXINE HYDROCHLORIDE 75 MG/1
150 CAPSULE, EXTENDED RELEASE ORAL DAILY
Qty: 180 CAPSULE | Refills: 3 | Status: SHIPPED | OUTPATIENT
Start: 2022-05-19

## 2022-05-19 NOTE — PATIENT INSTRUCTIONS
The MIND diet is a variation and combination of a healthy Mediterranean and DASH diet with scientific evidence to support neurological and mental health  Thus it is an excellent healthy eating pattern for many people with neurological disorders such as peripheral neuropathy, nerve injuries such as Bell's Palsy,  cerebrovascular accidents (strokes), cognitive impairment, early dementia  It alesha also be helpful in mental health issues such as depression, anxiety, neurodevelopmental disorders such as ADHD, Autism spectrum  Decrease salt    Your doctor and/or nutritionist can individualize the  MIND diet to meet your specific clinical situation  Note that if your are diabetic and you are on blood sugar lowering medications such as insulin or sulfonylureas (like glipizide, glimepiride, etc), this diet is so effective at improving and even reversing diabetes that your need for medication may go way down  To avoid very low blood sugar (hypoglycemia),  be sure to monitor your blood sugar very closely and adjust down your medications if needed under the guidance of your physician  https://imagesvc Justwork[employer]hcorp io/v3/mm/image?url=https%3A%2F%2Fstatic onecms  io%2Fwp-content%2Fuploads%2Fsites%2F12%9N3984%2F02%2Flentil-arugula-avocado-salad-2000 jpg    Here are the key aspects of the MIND diet:    Eat whole, unprocessed food, mainly plants  'Eat the rainbow' - eat different color veggies to get the full range of their nutrients  Vegetables should be about 1/2 of the plate, 1/4 of plate a (mainly plant based) protein source such as tofu), 1/4 of plate healthy starch or carb such as a whole grain or root vegetable  Eat less animal products, and choose healthy sources, such as healthy fish (e g  sardines, mackerel, herring, flounder, high quality salmon)  No more than the size of a pack of cards of animal products per day  Avoid all industrially produced (feedlot) red meat and poultry       Eat plenty of these key foods that support brain, nerve and mental health:  Avocados  Beans  Blueberries and other berries  Broccoli, Kale and other leafy greens are key: have a minimum of 1 serving a day in addition to other veggies  Extra-virgin olive oil  Flax seed (grind just prior to eating and toss on breakfast cereal, in salads)  Don't heat it because the omega-3 fatty acids are very delicate  Herbal teas: hibiscus, lemon balm, mint, etc   Fresh herbs & spices like cilantro, dill, rosemary, thyme, oregano, basil, mint, parsley; cinnamon, oregano, marjoram, allspice, saffron and others  Turmeric is especially anti-inflammatory - adding a small amount of black pepper will help its absorption  Many herbs and spices are rich in anti-oxidants  Nuts & seeds, e g  almonds, walnuts, rosio seeds, pumpkin, sunflower seeds  A handful a day will give you essential fatty acids and minerals like magnesium  Whole grains: oats, buckwheat, millet, teff, sorghum, amaranth  Cassia Cook is the only grain that is a complete protein source and therefore is a great staple grain  Whole wheat in moderation is ok  AVOID all white flour products, as they are often largely empty calories deficient in micronutrients and are high in glycemic index that induce inflammation and over-eating  Sweet potatoes (yams) are preferred over white potatoes  AVOID or at least minimize all these:  Processed foods: chips, cookies, frozen dinners, white bread, bagels, pastries, sweets and similar bakery products   Fast food, fried foods  Avoid all fast food restaurants like [employer], [employer] [place], [place], [facility], most [facility] take-out and similar cheap unhealthy food establishments  Processed and industrially produced meats: case, salami, pastrami, hot dogs, luncheon meats are filled with saturated fat and sodium that induce nerve and brain inflammation and damage   Occasional free range poultry or grass fed bison or wild game meat like venison (e g  once or twice a week, 6 oz  -  the size of a pack of cards) is probably ok  Do not eat regular chicken, which is the main source of cholesterol in the standard American diet and a major contributor to obesity and nerve/brain inflammation  Butter or margarine  These are high in saturated or dangerous trans fats  Cheese is high in saturated fat  Use not at all or minimally, such as a little sprinkle of a highly flavored cheese on food  Don't eat any food in which cheese is a main ingredient  Sugary drinks such as soda  Also avoid artificially sweetened sodas, which confuse our energy balancing mechanism and disturb our gut microbiome  Excess alcohol  Don't drink at all if there is a contraindication to drinking such as alcohol use disorder, liver disease, etc , Men should never exceed two drinks per day, women no more than 1 drink per day  A drink = one small glass of wine, one 12 oz  Beer or one shot of hard liquor  Here are some resources to learn more about the MIND diet and improving brain and nerve health: The Mind diet cookbook 2021 by Cachorro Solitario  Brief intro then a year's work of nice recipes   The Alzheimer's Solution by Alexander Szymanski and Jonathon Manley MD  Copyright 2017  Reviews healthy lifestyle as relates to brain health  Can use to help prevent/support healing of many neurological and mental health disorders, not just dementia  Has nice recipes in back of book  How not to Die by Neto Agrawal MD Copyright 3518  Evidence based nutrition to help prevent and support healing of many diseases  Has a  Cookbook  Useful web site: NutritionFacts  org      Smoothie for brain & nerve health (could have one a day):  Place in :   About 1 cup or more of water, milk, almond or soy milk, adjust for proper consistency  1 cup fresh kale or spinach or other dark leafy green  1 cup berries such as blueberries, blackberries or raspberries  1 very ripe banana or other fruit like dru, or other fruit of your choice  1 - 2 tablespoons of freshly ground flaxseed  1/4 cup nuts such as walnuts, almonds, hazelnuts, etc      Can also add per your preference:  Freshly ground Arnoldo or other seeds  Various spices: a dash of nutmeg, 1/2 tsp or so of turmeric, cinnamon  cardamom, coriander, etc    Flavors such as vanilla, cocoa powder (with a little sugar, like 1 teaspoon for palatability if needed)    If you have diabetes, add 1/2 tsp of cinnamon and sip slowly over 1 hour to avoid a sugar rush  Have fun experimenting and adjusting to your taste! The MIND diet should be part of a comprehensive program to help support healing from your medical condition  Be sure this is under the guidance of your health care provider

## 2022-05-19 NOTE — PROGRESS NOTES
ASSESSMENT/PLAN:  1  Essential hypertension  Increased dose of amlodipiine For times when BP is too low, can temporarily back off to 5 mg daily  - Blood Pressure Monitoring (B-D ASSURE BPM/AUTO ARM CUFF) MISC; Check BP regularly  Keep BP less than 140/90  Dispense: 1 each; Refill: 0  - amLODIPine (NORVASC) 5 mg tablet; Take 2 tablets (10 mg total) by mouth in the morning  Dispense: 180 tablet; Refill: 3  - lisinopril (ZESTRIL) 40 mg tablet; Take 1 tablet (40 mg total) by mouth in the morning  Dispense: 90 tablet; Refill: 3    2  Generalized anxiety disorder  Discussed self care  - LORazepam (ATIVAN) 0 5 mg tablet; Take 1 tablet (0 5 mg total) by mouth in the morning  Use only sparingly  Ekta Warren Dispense: 30 tablet; Refill: 0  - venlafaxine (EFFEXOR-XR) 75 mg 24 hr capsule; Take 2 capsules (150 mg total) by mouth in the morning  Dispense: 180 capsule; Refill: 3    MIND diet    3  Prediabetes  Healthy lifestyle reviewed    4  Diarrhea, unspecified type  Appears mild  Dx unclear  Could be IBS  - Ambulatory Referral to Gastroenterology; Future  - Ambulatory Referral to Gastroenterology; Future    5  Acute post-traumatic stress disorder  Discussed self care  Offered therapy referral    6  Mild intermittent asthma without complication  Clinically stable    7  Erectile dysfunction, unspecified erectile dysfunction type  May be due to age, stress  - Testosterone, free, total; Future  - sildenafil (VIAGRA) 50 MG tablet; Take 0 5 tablets (25 mg total) by mouth as needed in the morning for erectile dysfunction  Dispense: 10 tablet; Refill: 3    Return in about 4 weeks (around 6/16/2022) for Chronic dis  f/u 1/2 hr       Chief Complaint   Patient presents with    Medication Refill     Needs refills on all meds    Medication Management     Pt reports he stopped taking Atorvastatin because he felt tired, weak and felt he had a mental fog  Pt reports symptoms cleared after discontinuing medication              Reviewed and reinforced basics of healthy lifestyle  Risks and benefits of therapeutic plan discussed, answered all patient questions and concerns and patient expressed understanding and agreement of therapeutic plan  SUBJECTIVE:  Chief complaint and HPI: Linda Linton is a 67 y o  male who presents for follow up of multiple chronic medical problems, as listed below in problem list  All problems are relatively stable except for the following issues: f/u HTN  Occasional loose stool from time to time when upset or stressed  No bloody or tarry stool  Non-progressive  Wt is stable  No abdominal pain  No colonoscopy ever  Otherwise no other concerns;  Tries to keep busy to keep mind off feeling anxious  Uses little ativan  Asthma reasonably well controlled  About 2 wine spritzer per week  Doesn't drink beer  No hard liquor  We discussed that the venlafaxine could be raising his BP, but he prefers to stay on it and feels it helps     Past history of psychotropics:  Paxil - sexual side effects  Zoloft - didn't work well  Prozac - didn't help much  Venlafaxine did help      Review of systems:  No fever/chills/sweats/unexplained weight loss  No chest pain/shortness of breath  No change in digestion or bowel movements  No change in urination  No new musculoskeletal or neurological symptoms      Chart reviewed for relevant medical, surgical and psychosocial history, medications and allergies, labs and studies      Patient Active Problem List   Diagnosis    Acute post-traumatic stress disorder    Generalized anxiety disorder    Essential hypertension    Ganglion cyst of wrist    Mixed hyperlipidemia    Chronic bilateral low back pain without sciatica    Allergic rhinitis with postnasal drip    Mild intermittent asthma without complication    Gastroesophageal reflux disease without esophagitis    Snoring    Insomnia    Right knee pain    Prediabetes    History of alcohol abuse    Hepatic steatosis    Benign prostatic hyperplasia with urinary frequency             EXAM:    The patient appears well, in no apparent distress  Alert and oriented times three, pleasant and cooperative  Vital signs are as noted by the nurse  BP (!) 136/106   Pulse 89   Temp (!) 97 1 °F (36 2 °C) (Tympanic)   Resp 18   Ht 5' 11" (1 803 m)   Wt 92 4 kg (203 lb 9 6 oz)   SpO2 96%   BMI 28 40 kg/m²     Head: normocephalic, atraumatic  Eyes: well perfused conjunctiva, not clinically pale, not jaundiced  Ears: external ear: no gross lesions  Nose: no rhinorrhea  Neck: supple, trachea in the midline and no concerning cervical lymphadenopathy  Lungs: No respiratory labor  Clear to auscultation  Heart: Regular rate and rhythm, no murmurs, gallops or rubs  Abdomen: Benign: soft, non-tender, non-distended  No guarding or rebound  No masses or organomegaly  Normal bowel sounds,   Skin: No pallor  No rashes noted  Extremities: Moves all extremities normally   No pedal edema  R testes small (chronically X decades) R teste normal

## 2022-05-20 ENCOUNTER — TELEPHONE (OUTPATIENT)
Dept: FAMILY MEDICINE CLINIC | Facility: CLINIC | Age: 72
End: 2022-05-20

## 2022-05-20 NOTE — TELEPHONE ENCOUNTER
PA started for [facility] HCL ER 75 mg caps  with Key # [id number] on 05/20/2022   Thanks

## 2022-06-30 ENCOUNTER — RA CDI HCC (OUTPATIENT)
Dept: OTHER | Facility: HOSPITAL | Age: 72
End: 2022-06-30

## 2022-06-30 NOTE — PROGRESS NOTES
[address]  coding opportunities       Chart reviewed, no opportunity found: CHART REVIEWED, NO OPPORTUNITY FOUND        Patients Insurance     Medicare Insurance: Medicare

## 2022-07-07 ENCOUNTER — OFFICE VISIT (OUTPATIENT)
Dept: FAMILY MEDICINE CLINIC | Facility: CLINIC | Age: 72
End: 2022-07-07
Payer: MEDICARE

## 2022-07-07 VITALS
OXYGEN SATURATION: 96 % | TEMPERATURE: 97 F | HEART RATE: 84 BPM | SYSTOLIC BLOOD PRESSURE: 108 MMHG | BODY MASS INDEX: 27.75 KG/M2 | WEIGHT: 199 LBS | DIASTOLIC BLOOD PRESSURE: 70 MMHG | RESPIRATION RATE: 16 BRPM

## 2022-07-07 DIAGNOSIS — F41.1 GENERALIZED ANXIETY DISORDER: ICD-10-CM

## 2022-07-07 DIAGNOSIS — F10.11 HISTORY OF ALCOHOL ABUSE: ICD-10-CM

## 2022-07-07 DIAGNOSIS — F51.04 PSYCHOPHYSIOLOGICAL INSOMNIA: ICD-10-CM

## 2022-07-07 DIAGNOSIS — K76.0 HEPATIC STEATOSIS: ICD-10-CM

## 2022-07-07 DIAGNOSIS — M54.50 CHRONIC BILATERAL LOW BACK PAIN WITHOUT SCIATICA: ICD-10-CM

## 2022-07-07 DIAGNOSIS — G89.29 CHRONIC BILATERAL LOW BACK PAIN WITHOUT SCIATICA: ICD-10-CM

## 2022-07-07 DIAGNOSIS — I10 ESSENTIAL HYPERTENSION: Primary | ICD-10-CM

## 2022-07-07 DIAGNOSIS — F43.11 ACUTE POST-TRAUMATIC STRESS DISORDER: ICD-10-CM

## 2022-07-07 DIAGNOSIS — R73.03 PREDIABETES: ICD-10-CM

## 2022-07-07 PROCEDURE — 99214 OFFICE O/P EST MOD 30 MIN: CPT | Performed by: FAMILY MEDICINE

## 2022-07-07 RX ORDER — VENLAFAXINE HYDROCHLORIDE 150 MG/1
CAPSULE, EXTENDED RELEASE ORAL
COMMUNITY
Start: 2022-05-19 | End: 2022-10-03 | Stop reason: SDUPTHER

## 2022-07-07 NOTE — PATIENT INSTRUCTIONS
Usually take Amlodipine 10 mg daily  If BP too low, back off and just take Amlodipine 5 mg       Download Headspace stacy and do 10' meditation daily

## 2022-07-07 NOTE — PROGRESS NOTES
ASSESSMENT/PLAN:  1  Essential hypertension  BP well controlled    2  Prediabetes  Working on Mind DIET    3  Psychophysiological insomnia  Start daily Headspace stacy meditation, 10'/day    4  History of alcohol abuse  Stable    5  Generalized anxiety disorder  Improved  Meds and lifestyle changes help    6  Chronic bilateral low back pain without sciatica  Manageable with OTC meds    7  Acute post-traumatic stress disorder  Stable on meds and self care        Chief Complaint   Patient presents with    Follow-up           Reviewed and reinforced basics of healthy lifestyle  Risks and benefits of therapeutic plan discussed, answered all patient questions and concerns and patient expressed understanding and agreement of therapeutic plan  Return in about 3 months (around 10/7/2022) for Chronic dis  f/u 1/2 hr         SUBJECTIVE:  Chief complaint and HPI: Celeste Strickland is a 67 y o  male who presents for follow up of multiple chronic medical problems, as listed below in problem list  All problems are relatively stable except for the following issues: BP well controlled at home  105 - 11/70 - 90's  No hypotensive sx  Back pain, general aches: takes acetaminophen 1 or 2 pills a day  Venlafaxine 150 daily helps anxiety  Mood ok  Working on [email or web address] - a work in progress  Working on portion control  Increased veggies and fruit  Occas  Cheese  Had COVID in Dec  But has recovered except with sl  Occas  Cough  Was mild    Review of systems:  No fever/chills/sweats/unexplained weight loss  No chest pain/shortness of breath  No change in digestion or bowel movements  No change in urination  No new musculoskeletal or neurological symptoms      Chart reviewed for relevant medical, surgical and psychosocial history, medications and allergies, labs and studies      Patient Active Problem List   Diagnosis    Acute post-traumatic stress disorder    Generalized anxiety disorder    Essential hypertension    Erectile dysfunction  Ganglion cyst of wrist    Mixed hyperlipidemia    Chronic bilateral low back pain without sciatica    Allergic rhinitis with postnasal drip    Mild intermittent asthma without complication    Gastroesophageal reflux disease without esophagitis    Snoring    Insomnia    Right knee pain    Prediabetes    History of alcohol abuse    Hepatic steatosis    Benign prostatic hyperplasia with urinary frequency       BMI Counseling: Body mass index is 27 75 kg/m²  The BMI is above normal  Nutrition recommendations include encouraging healthy choices of fruits and vegetables, decreasing fast food intake, consuming healthier snacks and limiting drinks that contain sugar  Rationale for BMI follow-up plan is due to patient being overweight or obese  EXAM:    The patient appears well, in no apparent distress  Alert and oriented times three, pleasant and cooperative  Vital signs are as noted by the nurse  /70   Pulse 84   Temp (!) 97 °F (36 1 °C)   Resp 16   Wt 90 3 kg (199 lb)   SpO2 96%   BMI 27 75 kg/m²     Head: normocephalic, atraumatic  Eyes: well perfused conjunctiva, not clinically pale, not jaundiced  Ears: external ear: no gross lesions  Nose: no rhinorrhea  Neck: supple, trachea in the midline and no concerning cervical lymphadenopathy  Lungs: No respiratory labor  Clear to auscultation  Heart: Regular rate and rhythm, no murmurs, gallops or rubs  Abdomen: Benign: soft, non-tender, non-distended  No guarding or rebound  No masses or organomegaly  Normal bowel sounds,   Skin: No pallor  No rashes noted  Extremities: Moves all extremities normally   No pedal edema

## 2022-08-22 ENCOUNTER — TELEPHONE (OUTPATIENT)
Dept: GASTROENTEROLOGY | Facility: CLINIC | Age: 72
End: 2022-08-22

## 2022-08-22 NOTE — TELEPHONE ENCOUNTER
08/22/22  Screened by: Gabriel Lima MA    Referring Provider Dr Howard Earing: There is no height or weight on file to calculate BMI  Has patient been referred for a routine screening Colonoscopy? yes  Is the patient between 39-70 years old? yes      Previous Colonoscopy no   If yes:    Date:     Facility:     Reason:       SCHEDULING STAFF: If the patient is between 45yrs-49yrs, please advise patient to confirm benefits/coverage with their insurance company for a routine screening colonoscopy, some insurance carriers will only cover at [facility] or [facility]  If the patient is over 66years old, please schedule an office visit  Does the patient want to see a Gastroenterologist prior to their procedure OR are they having any GI symptoms? no    Has the patient been hospitalized or had abdominal surgery in the past 6 months? no    Does the patient use supplemental oxygen? no    Does the patient take Coumadin, Lovenox, Plavix, Elliquis, Xarelto, or other blood thinning medication? no    Has the patient had a stroke, cardiac event, or stent placed in the past year? no    SCHEDULING STAFF: If patient answers NO to above questions, then schedule procedure  If patient answers YES to above questions, then schedule office appointment  If patient is between 45yrs - 49yrs, please advise patient that we will have to confirm benefits & coverage with their insurance company for a routine screening colonoscopy

## 2022-08-23 ENCOUNTER — TELEPHONE (OUTPATIENT)
Dept: GASTROENTEROLOGY | Facility: CLINIC | Age: 72
End: 2022-08-23

## 2022-08-23 DIAGNOSIS — Z12.11 SPECIAL SCREENING FOR MALIGNANT NEOPLASMS, COLON: Primary | ICD-10-CM

## 2022-08-23 NOTE — TELEPHONE ENCOUNTER
Scheduled date of colonoscopy (as of today):10 14 22  Physician performing colonoscopy:DR IRIZARRY  Location of colonoscopy:[facility]  Bowel prep reviewed with patient:CHENTE  Instructions reviewed with patient by:SILVIA VERBALLY/MAILED  Clearances: N/A

## 2022-08-24 NOTE — TELEPHONE ENCOUNTER
Call from Ester Browning, pharmacist, advising Golytely is not available at this time and they are requesting an alternative substitution

## 2022-08-24 NOTE — TELEPHONE ENCOUNTER
Spoke with Elieser Gallardo from [facility] pharmacy, advise nulytely was an okay alternative  She verbalized understanding, no further questions

## 2022-10-03 ENCOUNTER — TELEPHONE (OUTPATIENT)
Dept: GASTROENTEROLOGY | Facility: CLINIC | Age: 72
End: 2022-10-03

## 2022-10-03 DIAGNOSIS — I10 ESSENTIAL HYPERTENSION: ICD-10-CM

## 2022-10-03 DIAGNOSIS — F41.1 GENERALIZED ANXIETY DISORDER: Primary | ICD-10-CM

## 2022-10-03 NOTE — TELEPHONE ENCOUNTER
Patients GI provider:  Dr Nicol Callaway     Number to return call: [phone] or [phone]    Reason for call: Pt calling to reschedule procedure  Can be reached at both numbers above       Scheduled procedure/appointment date if applicable:procedure 70/60/0200

## 2022-10-04 RX ORDER — LISINOPRIL 40 MG/1
40 TABLET ORAL DAILY
Qty: 90 TABLET | Refills: 3 | Status: SHIPPED | OUTPATIENT
Start: 2022-10-04

## 2022-10-04 RX ORDER — AMLODIPINE BESYLATE 5 MG/1
10 TABLET ORAL DAILY
Qty: 180 TABLET | Refills: 3 | Status: SHIPPED | OUTPATIENT
Start: 2022-10-04

## 2022-10-04 RX ORDER — VENLAFAXINE HYDROCHLORIDE 150 MG/1
150 CAPSULE, EXTENDED RELEASE ORAL DAILY
Qty: 90 CAPSULE | Refills: 1 | Status: SHIPPED | OUTPATIENT
Start: 2022-10-04

## 2022-10-04 NOTE — TELEPHONE ENCOUNTER
Spoke w/ pt/pt cxd his colonoscopy-pt states he will call the GI office back when he is ready to schedule per pt

## 2022-10-13 ENCOUNTER — TELEPHONE (OUTPATIENT)
Dept: FAMILY MEDICINE CLINIC | Facility: CLINIC | Age: 72
End: 2022-10-13

## 2022-11-15 DIAGNOSIS — F41.1 GENERALIZED ANXIETY DISORDER: ICD-10-CM

## 2022-11-15 DIAGNOSIS — J43.8 OTHER EMPHYSEMA (HCC): ICD-10-CM

## 2022-11-15 RX ORDER — ALBUTEROL SULFATE 90 UG/1
2 AEROSOL, METERED RESPIRATORY (INHALATION) EVERY 6 HOURS PRN
Qty: 18 G | Refills: 1 | Status: SHIPPED | OUTPATIENT
Start: 2022-11-15

## 2022-11-15 RX ORDER — LORAZEPAM 0.5 MG/1
0.5 TABLET ORAL DAILY
Qty: 30 TABLET | Refills: 0 | Status: SHIPPED | OUTPATIENT
Start: 2022-11-15

## 2022-11-18 DIAGNOSIS — N52.9 ERECTILE DYSFUNCTION, UNSPECIFIED ERECTILE DYSFUNCTION TYPE: ICD-10-CM

## 2022-11-18 RX ORDER — SILDENAFIL 50 MG/1
25 TABLET, FILM COATED ORAL DAILY PRN
Qty: 10 TABLET | Refills: 3 | Status: SHIPPED | OUTPATIENT
Start: 2022-11-18

## 2023-05-15 DIAGNOSIS — J43.8 OTHER EMPHYSEMA (HCC): ICD-10-CM

## 2023-05-16 RX ORDER — ALBUTEROL SULFATE 90 UG/1
AEROSOL, METERED RESPIRATORY (INHALATION)
Qty: 36 G | Refills: 6 | Status: SHIPPED | OUTPATIENT
Start: 2023-05-16

## 2023-05-17 ENCOUNTER — TELEPHONE (OUTPATIENT)
Dept: FAMILY MEDICINE CLINIC | Facility: CLINIC | Age: 73
End: 2023-05-17

## 2023-05-25 ENCOUNTER — OFFICE VISIT (OUTPATIENT)
Dept: FAMILY MEDICINE CLINIC | Facility: CLINIC | Age: 73
End: 2023-05-25

## 2023-05-25 VITALS
SYSTOLIC BLOOD PRESSURE: 118 MMHG | TEMPERATURE: 97.5 F | RESPIRATION RATE: 16 BRPM | OXYGEN SATURATION: 94 % | BODY MASS INDEX: 30.49 KG/M2 | HEIGHT: 70 IN | HEART RATE: 89 BPM | DIASTOLIC BLOOD PRESSURE: 78 MMHG | WEIGHT: 213 LBS

## 2023-05-25 DIAGNOSIS — F41.1 GENERALIZED ANXIETY DISORDER: ICD-10-CM

## 2023-05-25 DIAGNOSIS — K76.0 HEPATIC STEATOSIS: ICD-10-CM

## 2023-05-25 DIAGNOSIS — F10.11 HISTORY OF ALCOHOL ABUSE: ICD-10-CM

## 2023-05-25 DIAGNOSIS — F51.04 PSYCHOPHYSIOLOGICAL INSOMNIA: ICD-10-CM

## 2023-05-25 DIAGNOSIS — R09.82 ALLERGIC RHINITIS WITH POSTNASAL DRIP: ICD-10-CM

## 2023-05-25 DIAGNOSIS — F43.12 POST-TRAUMATIC STRESS DISORDER, CHRONIC: ICD-10-CM

## 2023-05-25 DIAGNOSIS — E78.2 MIXED HYPERLIPIDEMIA: Primary | ICD-10-CM

## 2023-05-25 DIAGNOSIS — R35.0 BENIGN PROSTATIC HYPERPLASIA WITH URINARY FREQUENCY: ICD-10-CM

## 2023-05-25 DIAGNOSIS — I10 ESSENTIAL HYPERTENSION: ICD-10-CM

## 2023-05-25 DIAGNOSIS — J30.9 ALLERGIC RHINITIS WITH POSTNASAL DRIP: ICD-10-CM

## 2023-05-25 DIAGNOSIS — R73.03 PREDIABETES: ICD-10-CM

## 2023-05-25 DIAGNOSIS — N40.1 BENIGN PROSTATIC HYPERPLASIA WITH URINARY FREQUENCY: ICD-10-CM

## 2023-05-25 DIAGNOSIS — Z23 ENCOUNTER FOR IMMUNIZATION: ICD-10-CM

## 2023-05-25 DIAGNOSIS — R68.89 EXERCISE INTOLERANCE: ICD-10-CM

## 2023-05-25 DIAGNOSIS — J45.20 MILD INTERMITTENT ASTHMA WITHOUT COMPLICATION: ICD-10-CM

## 2023-05-25 DIAGNOSIS — K21.9 GASTROESOPHAGEAL REFLUX DISEASE WITHOUT ESOPHAGITIS: ICD-10-CM

## 2023-05-25 RX ORDER — FLUTICASONE PROPIONATE 44 UG/1
2 AEROSOL, METERED RESPIRATORY (INHALATION) 2 TIMES DAILY
Qty: 10.6 G | Refills: 3 | Status: SHIPPED | OUTPATIENT
Start: 2023-05-25

## 2023-05-25 NOTE — PROGRESS NOTES
Assessment and Plan:   1  Mixed hyperlipidemia  Healthy eating reviewed  - Lipid panel; Future    2  Prediabetes  Healthy lifestyle reviewed  - HEMOGLOBIN A1C W/ EAG ESTIMATION; Future    3  Essential hypertension  Same medications healthy lifestyle reviewed    4  Psychophysiological insomnia  Under reasonable control    5  Mild intermittent asthma without complication  We will add:  - fluticasone (Flovent HFA) 44 mcg/act inhaler; Inhale 2 puffs 2 (two) times a day Rinse mouth after use  Dispense: 10 6 g; Refill: 3    6  Allergic rhinitis with postnasal drip  OTC meds    7  Exercise intolerance  This may just be due to the age but need to rule out more serious cause:  - Ambulatory Referral to Cardiology; Future  - CBC and differential; Future    8  Hepatic steatosis  Healthy lifestyle reviewed    9  Gastroesophageal reflux disease without esophagitis  Fairly good control    10  History of alcohol abuse  He does not appear to be abusing alcohol now  I told him its ideal to not drink at all but if he does want to have a drink then have a maximum of 1 drink per day    11  Generalized anxiety disorder  Fair control    12  Benign prostatic hyperplasia with urinary frequency    - PSA, total and free; Future    13   Post-traumatic stress disorder, chronic  Fairly good control  - CBC and differential; Future  Encouraged to get COVID by Valent vaccine  Problem List Items Addressed This Visit        Digestive    Gastroesophageal reflux disease without esophagitis    Hepatic steatosis       Respiratory    Allergic rhinitis with postnasal drip    Mild intermittent asthma without complication    Relevant Medications    fluticasone (Flovent HFA) 44 mcg/act inhaler       Cardiovascular and Mediastinum    Essential hypertension       Other    Generalized anxiety disorder    Mixed hyperlipidemia - Primary    Relevant Orders    Lipid panel    Insomnia    Prediabetes    Relevant Orders    HEMOGLOBIN A1C W/ EAG ESTIMATION    History of alcohol abuse    Benign prostatic hyperplasia with urinary frequency    Relevant Orders    PSA, total and free   Other Visit Diagnoses     Exercise intolerance        Relevant Orders    Ambulatory Referral to Cardiology    CBC and differential    Post-traumatic stress disorder, chronic        Relevant Orders    CBC and differential    Encounter for immunization        Relevant Orders    HEPATITIS B VACCINE ADULT IM (Completed)           Preventive health issues were discussed with patient, and age appropriate screening tests were ordered as noted in patient's After Visit Summary  Personalized health advice and appropriate referrals for health education or preventive services given if needed, as noted in patient's After Visit Summary  History of Present Illness:     Patient presents for a Medicare Wellness Visit    HPI   Patient Care Team:  Rojelio Garza MD as PCP - General (Family Medicine)  Rojelio Garza MD as PCP - [address] [place], [place] [address]Th [facility] ([facility])  Overall fees great  Some wheezy SOB with constricted chest  Has to use his inhaler, which relieves it  Staminal seems less recently though, with exertion  NO CP  Does feel a little heavy in chest with exertion  Still mows the grass  Does set him off coughing sometimes        Review of Systems:     Review of Systems   Constitutional: No fever, chills or sweats  HEENT: No eye or ear symptoms, sore throat, congested nose  Cardiorespiratory: No chest pain  Abdomen/Gastrointestinal: No abdominal pain or unusual change digestion or in bowel movements  Genitourinary: No change in urination  Neuromuscular: No new neurological symptoms or unexplained/new achy joints or muscles  Psych: No suicidal ideation       Problem List:     Patient Active Problem List   Diagnosis   • Acute post-traumatic stress disorder   • Generalized anxiety disorder   • Essential hypertension   • Erectile dysfunction   • Ganglion cyst of wrist   • Mixed hyperlipidemia   • Allergic rhinitis with postnasal drip   • Mild intermittent asthma without complication   • Gastroesophageal reflux disease without esophagitis   • Snoring   • Insomnia   • Right knee pain   • Prediabetes   • History of alcohol abuse   • Hepatic steatosis   • Benign prostatic hyperplasia with urinary frequency      Past Medical and Surgical History:     Past Medical History:   Diagnosis Date   • Acute non-recurrent pansinusitis 12/17/2018   • Arthritis    • Asthma    • Chronic bilateral low back pain without sciatica 7/28/2015    Takes daily acetaminophen, e g  500 mg 2 - 3X/day   • Concussion 12/16/2013   • Contact dermatitis 7/17/2018   • COPD (chronic obstructive pulmonary disease) (HCC)    • Depression    • Erectile dysfunction 2/20/2017   • Hiatal hernia    • Hyperlipidemia    • Hypertension    • Neck pain 12/19/2013   • Open wound of scalp 12/16/2013   • Pain in joint of right wrist 12/16/2013   • Palpitations 9/4/2012    Procedure/Onset: 02/15/2008   • Postconcussion syndrome 12/19/2013   • Sinus trouble    • Ventilation pneumonitis ([address] ) 12/16/2013     Past Surgical History:   Procedure Laterality Date   • HERNIA REPAIR        Family History:     Family History   Problem Relation Age of Onset   • Hypertension Father    • Skin cancer Sister    • Hydrocephalus Sister    • Diabetes Sister    • Hyperlipidemia Sister    • Obesity Sister    • Skin cancer Brother    • Glaucoma Brother    • Arthritis Family    • Stroke Mother    • No Known Problems Son    • Hypertension Sister       Social History:     Social History     Socioeconomic History   • Marital status: /Civil Union     Spouse name: None   • Number of children: None   • Years of education: None   • Highest education level: None   Occupational History   • Occupation: Retired construction and    Tobacco Use   • Smoking status: Never   • Smokeless tobacco: Never   Vaping Use   • Vaping Use: Never used   Substance and Sexual Activity   • Alcohol use: Yes     Comment: occ   • Drug use: No   • Sexual activity: Not Currently     Partners: Female   Other Topics Concern   • None   Social History Narrative         Social Determinants of Health     Financial Resource Strain: Low Risk  (5/25/2023)    Overall Financial Resource Strain (CARDIA)    • Difficulty of Paying Living Expenses: Not very hard   Food Insecurity: No Food Insecurity (8/18/2021)    Hunger Vital Sign    • Worried About Running Out of Food in the Last Year: Never true    • Ran Out of Food in the Last Year: Never true   Transportation Needs: No Transportation Needs (5/25/2023)    PRAPARE - Transportation    • Lack of Transportation (Medical): No    • Lack of Transportation (Non-Medical): No   Physical Activity: Inactive (8/18/2021)    Exercise Vital Sign    • Days of Exercise per Week: 0 days    • Minutes of Exercise per Session: 0 min   Stress: No Stress Concern Present (8/18/2021)    [address]    • Feeling of Stress : Only a little   Social Connections:  Moderately Isolated (8/18/2021)    Social Connection and Isolation Panel [NHANES]    • Frequency of Communication with Friends and Family: More than three times a week    • Frequency of Social Gatherings with Friends and Family: More than three times a week    • Attends Scientologist Services: Never    • Active Member of Clubs or Organizations: No    • Attends Club or Organization Meetings: Never    • Marital Status:    Intimate Partner Violence: Not At Risk (8/18/2021)    Humiliation, Afraid, Rape, and Kick questionnaire    • Fear of Current or Ex-Partner: No    • Emotionally Abused: No    • Physically Abused: No    • Sexually Abused: No   Housing Stability: Unknown (8/18/2021)    Housing Stability Vital Sign    • Unable to Pay for Housing in the Last Year: No    • Number of Places Lived in the Last Year: Not on file    • Unstable Housing in the Last Year: No      Medications and Allergies: Current Outpatient Medications   Medication Sig Dispense Refill   • albuterol (PROVENTIL HFA,VENTOLIN HFA) 90 mcg/act inhaler USE 2 INHALATIONS BY MOUTH EVERY 6 HOURS AS NEEDED FOR WHEEZING 36 g 6   • amLODIPine (NORVASC) 5 mg tablet Take 2 tablets (10 mg total) by mouth daily 180 tablet 3   • Blood Pressure Monitoring (B-D ASSURE BPM/AUTO ARM CUFF) MISC Check BP regularly  Keep BP less than 140/90 1 each 0   • Blood Pressure Monitoring (BLOOD PRESSURE CUFF) MISC Check BP a few times a week 1 each 0   • fluticasone (FLONASE) 50 mcg/act nasal spray 2 sprays into each nostril daily 16 g 0   • fluticasone (Flovent HFA) 44 mcg/act inhaler Inhale 2 puffs 2 (two) times a day Rinse mouth after use  10 6 g 3   • lisinopril (ZESTRIL) 40 mg tablet Take 1 tablet (40 mg total) by mouth daily 90 tablet 3   • LORazepam (ATIVAN) 0 5 mg tablet Take 1 tablet (0 5 mg total) by mouth daily Use only sparingly  30 tablet 0   • sildenafil (VIAGRA) 50 MG tablet Take 0 5 tablets (25 mg total) by mouth daily as needed for erectile dysfunction 10 tablet 3   • venlafaxine (EFFEXOR-XR) 150 mg 24 hr capsule TAKE 1 CAPSULE BY MOUTH  DAILY 30 capsule 1   • venlafaxine (EFFEXOR-XR) 75 mg 24 hr capsule Take 2 capsules (150 mg total) by mouth in the morning  180 capsule 3   • atorvastatin (LIPITOR) 20 mg tablet Take 1 tablet (20 mg total) by mouth daily (Patient not taking: Reported on 5/19/2022) 30 tablet 5     No current facility-administered medications for this visit       Allergies   Allergen Reactions   • Amoxicillin    • Dust Mite Extract    • Other Allergic Rhinitis     Cats, pollen/seasonal   • Oxycodone       Immunizations:     Immunization History   Administered Date(s) Administered   • COVID-19 PFIZER VACCINE 0 3 ML IM 02/24/2021, 03/16/2021, 09/25/2021   • Hep B, adult 09/03/2019, 11/04/2019, 05/25/2023   • INFLUENZA 10/01/2019, 10/01/2020, 09/25/2021   • Influenza Quadrivalent Preservative Free 3 years and older IM 10/20/2015, 11/02/2016   • Influenza, seasonal, injectable 10/23/2007, 09/23/2011, 10/05/2014   • Pneumococcal Conjugate 13-Valent 02/22/2016   • Pneumococcal Polysaccharide PPV23 02/20/2017   • Tdap 01/30/2007, 02/20/2017   • Zoster 11/05/2014      Health Maintenance:         Topic Date Due   • Colorectal Cancer Screening  Never done   • Hepatitis C Screening  Completed         Topic Date Due   • COVID-19 Vaccine (4 - Pfizer series) 11/20/2021      Medicare Screening Tests and Risk Assessments:     Graham Wood is here for his Subsequent Wellness visit  Health Risk Assessment:   Patient rates overall health as very good  Patient feels that their physical health rating is slightly better  Patient is very satisfied with their life  Eyesight was rated as same  Hearing was rated as same  Patient feels that their emotional and mental health rating is same  Patients states they are never, rarely angry  Patient states they are often unusually tired/fatigued  Pain experienced in the last 7 days has been none  Patient states that he has experienced no weight loss or gain in last 6 months  Fall Risk Screening: In the past year, patient has experienced: no history of falling in past year      Home Safety:  Patient does not have trouble with stairs inside or outside of their home  Patient has working smoke alarms and has working carbon monoxide detector  Home safety hazards include: none  Nutrition:   Current diet is Regular  Mediteranian diet  Does crave salt but tries not to use    Medications:   Patient is currently taking over-the-counter supplements  OTC medications include: acetaminophen  Patient is able to manage medications  Activities of Daily Living (ADLs)/Instrumental Activities of Daily Living (IADLs):   Walk and transfer into and out of bed and chair?: Yes  Dress and groom yourself?: Yes    Bathe or shower yourself?: Yes    Feed yourself?  Yes  Do your laundry/housekeeping?: Yes  Manage your money, pay your bills "and track your expenses?: Yes  Make your own meals?: Yes    Do your own shopping?: Yes    Previous Hospitalizations:   Any hospitalizations or ED visits within the last 12 months?: No      Advance Care Planning:   Living will: Yes    Advanced directive: Yes      PREVENTIVE SCREENINGS      Cardiovascular Screening:    General: Screening Not Indicated and History Lipid Disorder      Abdominal Aortic Aneurysm (AAA) Screening:    Risk factors include: age between 73-69 yo        Lung Cancer Screening:     General: Screening Not Indicated      Hepatitis C Screening:    General: Screening Current    Screening, Brief Intervention, and Referral to Treatment (SBIRT)    Screening  Typical number of drinks in a day: 0  Typical number of drinks in a week: 1  Interpretation: Low risk drinking behavior  AUDIT-C Screenin) How often did you have a drink containing alcohol in the past year? 2 to 4 times a month  2) How many drinks did you have on a typical day when you were drinking in the past year? 1 to 2  3) How often did you have 6 or more drinks on one occasion in the past year? never    AUDIT-C Score: 2  Interpretation: Score 0-3 (male): Negative screen for alcohol misuse    No results found  Physical Exam:     /78 (BP Location: Left arm, Patient Position: Sitting, Cuff Size: Standard)   Pulse 89   Temp 97 5 °F (36 4 °C) (Tympanic)   Resp 16   Ht 5' 10\" (1 778 m)   Wt 96 6 kg (213 lb)   SpO2 94%   BMI 30 56 kg/m²     Physical Exam   Alert, No acute distress  /78 (BP Location: Left arm, Patient Position: Sitting, Cuff Size: Standard)   Pulse 89   Temp 97 5 °F (36 4 °C) (Tympanic)   Resp 16   Ht 5' 10\" (1 778 m)   Wt 96 6 kg (213 lb)   SpO2 94%   BMI 30 56 kg/m²     Head, ears, eyes, nose, throat, neck:  Head atraumatic, normocephalic   Conjunctiva clear, pupils equal and reactive to light  Throat: within normal limits  Tympanic membranes clear  Neck supple without concerning nodes, masses or " thyromegaly    Respiratory: No respiratory distress   Lungs clear to auscultation  Cardiac: Heart regular rate and rhythm, normal S1 and S2, no murmur  Abdomen benign Soft and non-tender  Extremities: no cyanosis, clubbing or edema  NO inguinal masses           Carlotta Resendiz MD

## 2023-05-25 NOTE — PATIENT INSTRUCTIONS
Get Bivalent COVID boosters X 2, 4 mos at least apart  Bring in your vaccine record if you have it     Continue healthy eating and exercise

## 2023-06-15 ENCOUNTER — APPOINTMENT (OUTPATIENT)
Dept: LAB | Facility: HOSPITAL | Age: 73
End: 2023-06-15
Attending: FAMILY MEDICINE
Payer: COMMERCIAL

## 2023-06-15 DIAGNOSIS — K76.0 HEPATIC STEATOSIS: ICD-10-CM

## 2023-06-15 DIAGNOSIS — R68.89 EXERCISE INTOLERANCE: ICD-10-CM

## 2023-06-15 DIAGNOSIS — N40.1 BENIGN PROSTATIC HYPERPLASIA WITH URINARY FREQUENCY: ICD-10-CM

## 2023-06-15 DIAGNOSIS — R35.0 BENIGN PROSTATIC HYPERPLASIA WITH URINARY FREQUENCY: ICD-10-CM

## 2023-06-15 DIAGNOSIS — R73.03 PREDIABETES: ICD-10-CM

## 2023-06-15 DIAGNOSIS — E78.2 MIXED HYPERLIPIDEMIA: ICD-10-CM

## 2023-06-15 DIAGNOSIS — F43.12 POST-TRAUMATIC STRESS DISORDER, CHRONIC: ICD-10-CM

## 2023-06-15 LAB
ALBUMIN SERPL BCP-MCNC: 4.6 G/DL (ref 3.5–5)
ALP SERPL-CCNC: 71 U/L (ref 34–104)
ALT SERPL W P-5'-P-CCNC: 91 U/L (ref 7–52)
ANION GAP SERPL CALCULATED.3IONS-SCNC: 9 MMOL/L (ref 4–13)
AST SERPL W P-5'-P-CCNC: 54 U/L (ref 13–39)
BASOPHILS # BLD AUTO: 0.1 THOUSANDS/ÂΜL (ref 0–0.1)
BASOPHILS NFR BLD AUTO: 1 % (ref 0–1)
BILIRUB SERPL-MCNC: 0.92 MG/DL (ref 0.2–1)
BUN SERPL-MCNC: 23 MG/DL (ref 5–25)
CALCIUM SERPL-MCNC: 10 MG/DL (ref 8.4–10.2)
CHLORIDE SERPL-SCNC: 101 MMOL/L (ref 96–108)
CHOLEST SERPL-MCNC: 211 MG/DL
CO2 SERPL-SCNC: 26 MMOL/L (ref 21–32)
CREAT SERPL-MCNC: 1.26 MG/DL (ref 0.6–1.3)
EOSINOPHIL # BLD AUTO: 0.21 THOUSAND/ÂΜL (ref 0–0.61)
EOSINOPHIL NFR BLD AUTO: 3 % (ref 0–6)
ERYTHROCYTE [DISTWIDTH] IN BLOOD BY AUTOMATED COUNT: 12.3 % (ref 11.6–15.1)
EST. AVERAGE GLUCOSE BLD GHB EST-MCNC: 128 MG/DL
GFR SERPL CREATININE-BSD FRML MDRD: 56 ML/MIN/1.73SQ M
GLUCOSE P FAST SERPL-MCNC: 139 MG/DL (ref 65–99)
HBA1C MFR BLD: 6.1 %
HCT VFR BLD AUTO: 47.4 % (ref 36.5–49.3)
HDLC SERPL-MCNC: 42 MG/DL
HGB BLD-MCNC: 15.8 G/DL (ref 12–17)
IMM GRANULOCYTES # BLD AUTO: 0.04 THOUSAND/UL (ref 0–0.2)
IMM GRANULOCYTES NFR BLD AUTO: 1 % (ref 0–2)
LDLC SERPL CALC-MCNC: 138 MG/DL (ref 0–100)
LYMPHOCYTES # BLD AUTO: 1.83 THOUSANDS/ÂΜL (ref 0.6–4.47)
LYMPHOCYTES NFR BLD AUTO: 24 % (ref 14–44)
MCH RBC QN AUTO: 33.1 PG (ref 26.8–34.3)
MCHC RBC AUTO-ENTMCNC: 33.3 G/DL (ref 31.4–37.4)
MCV RBC AUTO: 99 FL (ref 82–98)
MONOCYTES # BLD AUTO: 0.76 THOUSAND/ÂΜL (ref 0.17–1.22)
MONOCYTES NFR BLD AUTO: 10 % (ref 4–12)
NEUTROPHILS # BLD AUTO: 4.63 THOUSANDS/ÂΜL (ref 1.85–7.62)
NEUTS SEG NFR BLD AUTO: 61 % (ref 43–75)
NONHDLC SERPL-MCNC: 169 MG/DL
NRBC BLD AUTO-RTO: 0 /100 WBCS
PLATELET # BLD AUTO: 314 THOUSANDS/UL (ref 149–390)
PMV BLD AUTO: 10.4 FL (ref 8.9–12.7)
POTASSIUM SERPL-SCNC: 4.7 MMOL/L (ref 3.5–5.3)
PROT SERPL-MCNC: 8.5 G/DL (ref 6.4–8.4)
RBC # BLD AUTO: 4.78 MILLION/UL (ref 3.88–5.62)
SODIUM SERPL-SCNC: 136 MMOL/L (ref 135–147)
TRIGL SERPL-MCNC: 157 MG/DL
WBC # BLD AUTO: 7.57 THOUSAND/UL (ref 4.31–10.16)

## 2023-06-15 PROCEDURE — 84153 ASSAY OF PSA TOTAL: CPT

## 2023-06-15 PROCEDURE — 80061 LIPID PANEL: CPT

## 2023-06-15 PROCEDURE — 84154 ASSAY OF PSA FREE: CPT

## 2023-06-15 PROCEDURE — 80053 COMPREHEN METABOLIC PANEL: CPT

## 2023-06-15 PROCEDURE — 85025 COMPLETE CBC W/AUTO DIFF WBC: CPT

## 2023-06-15 PROCEDURE — 36415 COLL VENOUS BLD VENIPUNCTURE: CPT

## 2023-06-15 PROCEDURE — 83036 HEMOGLOBIN GLYCOSYLATED A1C: CPT

## 2023-06-17 LAB
PSA FREE MFR SERPL: 29.1 %
PSA FREE SERPL-MCNC: 0.32 NG/ML
PSA SERPL-MCNC: 1.1 NG/ML (ref 0–4)

## 2023-06-28 DIAGNOSIS — F41.1 GENERALIZED ANXIETY DISORDER: ICD-10-CM

## 2023-06-28 RX ORDER — VENLAFAXINE HYDROCHLORIDE 150 MG/1
150 CAPSULE, EXTENDED RELEASE ORAL DAILY
Qty: 30 CAPSULE | Refills: 5 | Status: SHIPPED | OUTPATIENT
Start: 2023-06-28 | End: 2024-01-02

## 2023-07-16 DIAGNOSIS — I10 ESSENTIAL HYPERTENSION: ICD-10-CM

## 2023-07-17 RX ORDER — LISINOPRIL 40 MG/1
TABLET ORAL
Qty: 90 TABLET | Refills: 3 | Status: SHIPPED | OUTPATIENT
Start: 2023-07-17

## 2023-07-17 RX ORDER — AMLODIPINE BESYLATE 5 MG/1
TABLET ORAL
Qty: 180 TABLET | Refills: 3 | Status: SHIPPED | OUTPATIENT
Start: 2023-07-17 | End: 2023-09-05 | Stop reason: SDUPTHER

## 2023-07-26 DIAGNOSIS — J45.20 MILD INTERMITTENT ASTHMA WITHOUT COMPLICATION: ICD-10-CM

## 2023-07-26 RX ORDER — FLUTICASONE PROPIONATE 44 MCG
AEROSOL WITH ADAPTER (GRAM) INHALATION
Qty: 31.8 G | Refills: 3 | Status: SHIPPED | OUTPATIENT
Start: 2023-07-26

## 2023-08-31 ENCOUNTER — OFFICE VISIT (OUTPATIENT)
Dept: FAMILY MEDICINE CLINIC | Facility: CLINIC | Age: 73
End: 2023-08-31
Payer: COMMERCIAL

## 2023-08-31 VITALS
OXYGEN SATURATION: 95 % | RESPIRATION RATE: 16 BRPM | DIASTOLIC BLOOD PRESSURE: 70 MMHG | WEIGHT: 210 LBS | HEART RATE: 79 BPM | BODY MASS INDEX: 30.13 KG/M2 | SYSTOLIC BLOOD PRESSURE: 117 MMHG

## 2023-08-31 DIAGNOSIS — R09.82 ALLERGIC RHINITIS WITH POSTNASAL DRIP: ICD-10-CM

## 2023-08-31 DIAGNOSIS — F10.11 HISTORY OF ALCOHOL ABUSE: ICD-10-CM

## 2023-08-31 DIAGNOSIS — J30.9 ALLERGIC RHINITIS WITH POSTNASAL DRIP: ICD-10-CM

## 2023-08-31 DIAGNOSIS — R73.03 PREDIABETES: ICD-10-CM

## 2023-08-31 DIAGNOSIS — J45.20 MILD INTERMITTENT ASTHMA WITHOUT COMPLICATION: Primary | ICD-10-CM

## 2023-08-31 DIAGNOSIS — I10 ESSENTIAL HYPERTENSION: ICD-10-CM

## 2023-08-31 DIAGNOSIS — E78.2 MIXED HYPERLIPIDEMIA: ICD-10-CM

## 2023-08-31 PROCEDURE — 99214 OFFICE O/P EST MOD 30 MIN: CPT | Performed by: FAMILY MEDICINE

## 2023-08-31 RX ORDER — ATORVASTATIN CALCIUM 40 MG/1
40 TABLET, FILM COATED ORAL DAILY
Qty: 30 TABLET | Refills: 5 | Status: SHIPPED | OUTPATIENT
Start: 2023-08-31

## 2023-08-31 NOTE — PROGRESS NOTES
ASSESSMENT/PLAN:  1. Mild intermittent asthma without complication  We reviewed proper use of meds    2. Allergic rhinitis with postnasal drip  Same Flonase    3. Essential hypertension  Good BP control    4. Prediabetes  Monitor    5. Mixed hyperlipidemia  Healthy lifestyle. Eating more plant based  Restart Atorvastatin at 40 mg/d    6. History of alcohol abuse  Avoiding most of the time. Always stays within 2 or less drinks        Chief Complaint   Patient presents with   • Follow-up           Reviewed and reinforced basics of healthy lifestyle  Risks and benefits of therapeutic plan discussed, answered all patient questions and concerns and patient expressed understanding and agreement of therapeutic plan. Return in about 3 months (around 11/30/2023). SUBJECTIVE:  Chief complaint and HPI: Lauro Prince is a 68 y.o. male who presents for follow up of multiple chronic medical problems, as listed below in problem list. All problems are relatively stable except for the following issues: no acute issues      Review of systems:  No fever/chills/sweats/unexplained weight loss  No chest pain/shortness of breath  No change in digestion or bowel movements  No change in urination  No new musculoskeletal or neurological symptoms  NEG    Chart reviewed for relevant medical, surgical and psychosocial history, medications and allergies, labs and studies.     Patient Active Problem List   Diagnosis   • Acute post-traumatic stress disorder   • Generalized anxiety disorder   • Essential hypertension   • Erectile dysfunction   • Ganglion cyst of wrist   • Mixed hyperlipidemia   • Allergic rhinitis with postnasal drip   • Mild intermittent asthma without complication   • Gastroesophageal reflux disease without esophagitis   • Snoring   • Insomnia   • Right knee pain   • Prediabetes   • History of alcohol abuse   • Hepatic steatosis   • Benign prostatic hyperplasia with urinary frequency             EXAM:    The patient appears well, in no apparent distress. Alert and oriented times three, pleasant and cooperative. Vital signs are as noted by the nurse. /70   Pulse 79   Resp 16   Wt 95.3 kg (210 lb)   SpO2 95%   BMI 30.13 kg/m²     Head: normocephalic, atraumatic  Eyes: well perfused conjunctiva, not clinically pale, not jaundiced  Ears: external ear: no gross lesions  Nose: no rhinorrhea  Neck: supple, trachea in the midline and no concerning cervical lymphadenopathy  Lungs: No respiratory labor. Clear to auscultation  Heart: Regular rate and rhythm, no murmurs, gallops or rubs  Abdomen: Benign: soft, non-tender, non-distended. No guarding or rebound. No masses or organomegaly. Normal bowel sounds,   Skin: No pallor. No rashes noted  Extremities: Moves all extremities normally.  No pedal edema  Neuro: Grossly normal

## 2023-09-05 DIAGNOSIS — I10 ESSENTIAL HYPERTENSION: ICD-10-CM

## 2023-09-05 RX ORDER — AMLODIPINE BESYLATE 5 MG/1
10 TABLET ORAL DAILY
Qty: 180 TABLET | Refills: 3 | Status: SHIPPED | OUTPATIENT
Start: 2023-09-05

## 2023-10-04 ENCOUNTER — APPOINTMENT (OUTPATIENT)
Dept: LAB | Facility: HOSPITAL | Age: 73
End: 2023-10-04
Attending: FAMILY MEDICINE
Payer: COMMERCIAL

## 2023-10-04 DIAGNOSIS — E78.2 MIXED HYPERLIPIDEMIA: ICD-10-CM

## 2023-10-04 DIAGNOSIS — I10 ESSENTIAL HYPERTENSION: ICD-10-CM

## 2023-10-04 DIAGNOSIS — R73.03 PREDIABETES: ICD-10-CM

## 2023-10-04 LAB
ALBUMIN SERPL BCP-MCNC: 4.3 G/DL (ref 3.5–5)
ALP SERPL-CCNC: 62 U/L (ref 34–104)
ALT SERPL W P-5'-P-CCNC: 78 U/L (ref 7–52)
ANION GAP SERPL CALCULATED.3IONS-SCNC: 9 MMOL/L
AST SERPL W P-5'-P-CCNC: 60 U/L (ref 13–39)
BILIRUB SERPL-MCNC: 0.99 MG/DL (ref 0.2–1)
BUN SERPL-MCNC: 21 MG/DL (ref 5–25)
CALCIUM SERPL-MCNC: 9.3 MG/DL (ref 8.4–10.2)
CHLORIDE SERPL-SCNC: 103 MMOL/L (ref 96–108)
CHOLEST SERPL-MCNC: 129 MG/DL
CO2 SERPL-SCNC: 23 MMOL/L (ref 21–32)
CREAT SERPL-MCNC: 0.95 MG/DL (ref 0.6–1.3)
EST. AVERAGE GLUCOSE BLD GHB EST-MCNC: 148 MG/DL
GFR SERPL CREATININE-BSD FRML MDRD: 79 ML/MIN/1.73SQ M
GLUCOSE P FAST SERPL-MCNC: 126 MG/DL (ref 65–99)
HBA1C MFR BLD: 6.8 %
HDLC SERPL-MCNC: 38 MG/DL
LDLC SERPL CALC-MCNC: 67 MG/DL (ref 0–100)
NONHDLC SERPL-MCNC: 91 MG/DL
POTASSIUM SERPL-SCNC: 4.1 MMOL/L (ref 3.5–5.3)
PROT SERPL-MCNC: 8.2 G/DL (ref 6.4–8.4)
SODIUM SERPL-SCNC: 135 MMOL/L (ref 135–147)
TRIGL SERPL-MCNC: 119 MG/DL

## 2023-10-04 PROCEDURE — 80061 LIPID PANEL: CPT

## 2023-10-04 PROCEDURE — 83036 HEMOGLOBIN GLYCOSYLATED A1C: CPT

## 2023-10-04 PROCEDURE — 36415 COLL VENOUS BLD VENIPUNCTURE: CPT

## 2023-10-04 PROCEDURE — 80053 COMPREHEN METABOLIC PANEL: CPT

## 2023-11-20 DIAGNOSIS — N52.9 ERECTILE DYSFUNCTION, UNSPECIFIED ERECTILE DYSFUNCTION TYPE: ICD-10-CM

## 2023-11-20 RX ORDER — SILDENAFIL 50 MG/1
TABLET, FILM COATED ORAL
Qty: 30 TABLET | Refills: 3 | Status: SHIPPED | OUTPATIENT
Start: 2023-11-20

## 2023-12-23 ENCOUNTER — OFFICE VISIT (OUTPATIENT)
Dept: URGENT CARE | Facility: CLINIC | Age: 73
End: 2023-12-23
Payer: COMMERCIAL

## 2023-12-23 VITALS
SYSTOLIC BLOOD PRESSURE: 112 MMHG | BODY MASS INDEX: 31.07 KG/M2 | HEIGHT: 70 IN | RESPIRATION RATE: 18 BRPM | OXYGEN SATURATION: 95 % | HEART RATE: 96 BPM | TEMPERATURE: 98 F | WEIGHT: 217 LBS | DIASTOLIC BLOOD PRESSURE: 75 MMHG

## 2023-12-23 DIAGNOSIS — J44.1 COPD EXACERBATION (HCC): ICD-10-CM

## 2023-12-23 DIAGNOSIS — J20.9 ACUTE BRONCHITIS, UNSPECIFIED ORGANISM: Primary | ICD-10-CM

## 2023-12-23 PROCEDURE — 99213 OFFICE O/P EST LOW 20 MIN: CPT | Performed by: PHYSICIAN ASSISTANT

## 2023-12-23 RX ORDER — AZITHROMYCIN 250 MG/1
TABLET, FILM COATED ORAL
Qty: 6 TABLET | Refills: 0 | Status: SHIPPED | OUTPATIENT
Start: 2023-12-23 | End: 2023-12-27

## 2023-12-23 RX ORDER — PREDNISONE 20 MG/1
TABLET ORAL
Qty: 15 TABLET | Refills: 0 | Status: SHIPPED | OUTPATIENT
Start: 2023-12-23

## 2023-12-23 RX ORDER — BENZONATATE 200 MG/1
200 CAPSULE ORAL 3 TIMES DAILY PRN
Qty: 20 CAPSULE | Refills: 0 | Status: SHIPPED | OUTPATIENT
Start: 2023-12-23

## 2023-12-23 NOTE — PATIENT INSTRUCTIONS
1. Use albuterol inhaler as directed.  2. Use over-the-counter plain Mucinex or Robitussin for phlegm relief.  3. Increase oral fluids.    4. Use over-the-counter ibuprofen or acetaminophen as needed for fever pain.  5.  Fill and take the [facility] prescription if your symptoms do not improve within the next 5 to 7 days or if they worsen at any point.  6. Go to the ER with any worsening symptoms.      Trilobed Flap Text: The defect edges were debeveled with a #15 scalpel blade.  Given the location of the defect and the proximity to free margins a trilobed flap was deemed most appropriate.  Using a sterile surgical marker, an appropriate trilobed flap drawn around the defect.    The area thus outlined was incised deep to adipose tissue with a #15 scalpel blade.  The skin margins were undermined to an appropriate distance in all directions utilizing iris scissors.

## 2023-12-23 NOTE — PROGRESS NOTES
[facility]        NAME: Miguel Ángel Ramos is a 73 y.o. male  : 1950    MRN: [id number]  DATE: 2023  TIME: 3:31 PM    Assessment and Plan   Acute bronchitis, unspecified organism [J20.9]  1. Acute bronchitis, unspecified organism  azithromycin (ZITHROMAX) 250 mg tablet    predniSONE 20 mg tablet    benzonatate (TESSALON) 200 MG capsule      2. COPD exacerbation (HCC)  azithromycin (ZITHROMAX) 250 mg tablet    predniSONE 20 mg tablet    benzonatate (TESSALON) 200 MG capsule            Patient Instructions   1. Use albuterol inhaler as directed.  2. Use over-the-counter plain Mucinex or Robitussin for phlegm relief.  3. Increase oral fluids.    4. Use over-the-counter ibuprofen or acetaminophen as needed for fever pain.  5.  Fill and take the [facility] prescription if your symptoms do not improve within the next 5 to 7 days or if they worsen at any point.  6. Go to the ER with any worsening symptoms.         Chief Complaint     Chief Complaint   Patient presents with    Cold Like Symptoms     Chest congestion, nasal congestion, sinus pain, cough, covid negative at home         History of Present Illness       73-year-old male patient with a 6-day history of persistent nasal congestion, runny nose, worsening cough, intermittent chest tightness, intermittent exertional shortness of breath.  Denies any fever or chills.  Denies any GI symptoms.  Patient does have underlying COPD and is concerned about the possibility of exacerbation and of lung infection.  He tested negative for COVID this morning.        Review of Systems   Review of Systems   Constitutional:  Negative for chills, fatigue and fever.   HENT:  Positive for congestion and rhinorrhea. Negative for facial swelling, sinus pain and sore throat.    Respiratory:  Positive for cough, chest tightness, shortness of breath and wheezing.    Cardiovascular:  Negative for chest pain.   Gastrointestinal:  Negative for abdominal pain.    Musculoskeletal:  Negative for myalgias.   Skin:  Negative for rash.         Current Medications       Current Outpatient Medications:     albuterol (PROVENTIL HFA,VENTOLIN HFA) 90 mcg/act inhaler, USE 2 INHALATIONS BY MOUTH EVERY 6 HOURS AS NEEDED FOR WHEEZING, Disp: 36 g, Rfl: 6    amLODIPine (NORVASC) 5 mg tablet, Take 2 tablets (10 mg total) by mouth daily, Disp: 180 tablet, Rfl: 3    atorvastatin (LIPITOR) 40 mg tablet, Take 1 tablet (40 mg total) by mouth daily, Disp: 30 tablet, Rfl: 5    azithromycin (ZITHROMAX) 250 mg tablet, Take 2 tablets today then 1 tablet daily x 4 days, Disp: 6 tablet, Rfl: 0    benzonatate (TESSALON) 200 MG capsule, Take 1 capsule (200 mg total) by mouth 3 (three) times a day as needed for cough, Disp: 20 capsule, Rfl: 0    Blood Pressure Monitoring (B-D ASSURE BPM/AUTO ARM CUFF) MISC, Check BP regularly. Keep BP less than 140/90, Disp: 1 each, Rfl: 0    Blood Pressure Monitoring (BLOOD PRESSURE CUFF) MISC, Check BP a few times a week, Disp: 1 each, Rfl: 0    Flovent HFA 44 MCG/ACT inhaler, INHALE 2 INHALATIONS BY MOUTH  TWICE DAILY RINSE MOUTH AFTER  USE, Disp: 31.8 g, Rfl: 3    lisinopril (ZESTRIL) 40 mg tablet, TAKE 1 TABLET BY MOUTH  DAILY, Disp: 90 tablet, Rfl: 3    predniSONE 20 mg tablet, Take 3 tabs all at once daily for 3 days then 2 tabs for 2 days then 1 tab for 2 days., Disp: 15 tablet, Rfl: 0    sildenafil (VIAGRA) 50 MG tablet, TAKE ONE-HALF TABLET BY MOUTH  DAILY AS NEEDED FOR ERECTILE  DYSFUNCTION, Disp: 30 tablet, Rfl: 3    venlafaxine (EFFEXOR-XR) 150 mg 24 hr capsule, Take 1 capsule (150 mg total) by mouth daily, Disp: 30 capsule, Rfl: 5    fluticasone (FLONASE) 50 mcg/act nasal spray, 2 sprays into each nostril daily (Patient not taking: Reported on 12/23/2023), Disp: 16 g, Rfl: 0    LORazepam (ATIVAN) 0.5 mg tablet, Take 1 tablet (0.5 mg total) by mouth daily Use only sparingly. (Patient not taking: Reported on 12/23/2023), Disp: 30 tablet, Rfl: 0    Current  "Allergies     Allergies as of 12/23/2023 - Reviewed 08/31/2023   Allergen Reaction Noted    Amoxicillin  12/17/2018    Dust mite extract  12/19/2013    Other Allergic Rhinitis 12/19/2013    Oxycodone  01/10/2014            The following portions of the patient's history were reviewed and updated as appropriate: allergies, current medications, past family history, past medical history, past social history, past surgical history and problem list.     Past Medical History:   Diagnosis Date    Acute non-recurrent pansinusitis 12/17/2018    Arthritis     Asthma     Chronic bilateral low back pain without sciatica 7/28/2015    Takes daily acetaminophen, e.g. 500 mg 2 - 3X/day    Concussion 12/16/2013    Contact dermatitis 7/17/2018    COPD (chronic obstructive pulmonary disease) (Formerly [facility])     Depression     Erectile dysfunction 2/20/2017    Hiatal hernia     Hyperlipidemia     Neck pain 12/19/2013    Open wound of scalp 12/16/2013    Pain in joint of right wrist 12/16/2013    Palpitations 9/4/2012    Procedure/Onset: 02/15/2008    Postconcussion syndrome 12/19/2013    Sinus trouble     Ventilation pneumonitis (Formerly [facility]) 12/16/2013       Past Surgical History:   Procedure Laterality Date    HERNIA REPAIR         Family History   Problem Relation Age of Onset    Hypertension Father     Skin cancer Sister     Hydrocephalus Sister     Diabetes Sister     Hyperlipidemia Sister     Obesity Sister     Skin cancer Brother     Glaucoma Brother     Arthritis Family     Stroke Mother     No Known Problems Son     Hypertension Sister          Medications have been verified.        Objective   /75   Pulse 96   Temp 98 °F (36.7 °C)   Resp 18   Ht 5' 10\" (1.778 m)   Wt 98.4 kg (217 lb)   SpO2 95%   BMI 31.14 kg/m²        Physical Exam     Physical Exam  Vitals and nursing note reviewed.   Constitutional:       Appearance: Normal appearance. He is not ill-appearing.   HENT:      Head: Normocephalic and atraumatic.      Right Ear: " Tympanic membrane normal.      Left Ear: Tympanic membrane normal.      Nose: Nose normal.      Mouth/Throat:      Mouth: Mucous membranes are moist.      Pharynx: Oropharynx is clear.   Eyes:      Conjunctiva/sclera: Conjunctivae normal.      Pupils: Pupils are equal, round, and reactive to light.   Cardiovascular:      Rate and Rhythm: Normal rate and regular rhythm.      Pulses: Normal pulses.      Heart sounds: Normal heart sounds.   Pulmonary:      Effort: Pulmonary effort is normal.      Breath sounds: Decreased air movement present. Wheezing present.   Abdominal:      Tenderness: There is no abdominal tenderness.   Musculoskeletal:         General: Normal range of motion.      Cervical back: Normal range of motion and neck supple.   Skin:     General: Skin is warm and dry.      Capillary Refill: Capillary refill takes less than 2 seconds.   Neurological:      General: No focal deficit present.      Mental Status: He is alert and oriented to person, place, and time.   Psychiatric:         Mood and Affect: Mood normal.         Behavior: Behavior normal.

## 2024-01-01 DIAGNOSIS — F41.1 GENERALIZED ANXIETY DISORDER: ICD-10-CM

## 2024-01-02 RX ORDER — VENLAFAXINE HYDROCHLORIDE 150 MG/1
150 CAPSULE, EXTENDED RELEASE ORAL DAILY
Qty: 30 CAPSULE | Refills: 11 | Status: SHIPPED | OUTPATIENT
Start: 2024-01-02

## 2024-01-05 DIAGNOSIS — E78.2 MIXED HYPERLIPIDEMIA: ICD-10-CM

## 2024-01-08 RX ORDER — ATORVASTATIN CALCIUM 40 MG/1
40 TABLET, FILM COATED ORAL DAILY
Qty: 80 TABLET | Refills: 3 | Status: SHIPPED | OUTPATIENT
Start: 2024-01-08

## 2024-04-17 DIAGNOSIS — I10 ESSENTIAL HYPERTENSION: ICD-10-CM

## 2024-04-18 RX ORDER — LISINOPRIL 40 MG/1
TABLET ORAL
Qty: 90 TABLET | Refills: 0 | Status: SHIPPED | OUTPATIENT
Start: 2024-04-18

## 2024-06-19 DIAGNOSIS — I10 ESSENTIAL HYPERTENSION: ICD-10-CM

## 2024-06-20 ENCOUNTER — TELEPHONE (OUTPATIENT)
Age: 74
End: 2024-06-20

## 2024-06-20 RX ORDER — LISINOPRIL 40 MG/1
TABLET ORAL
Qty: 90 TABLET | Refills: 3 | Status: SHIPPED | OUTPATIENT
Start: 2024-06-20

## 2024-06-20 NOTE — TELEPHONE ENCOUNTER
----- Message from Blaine Mir MD sent at 6/20/2024  1:08 PM EDT -----  Regarding: Please schedule patient for Medicare Annual Wellness Visit.  Please schedule patient for Medicare Annual Wellness Visit within the next 8 weeks. Also, please make sure patient's e-mail is on the chart and has completed [employer] sign-up. Thanks, Dr. Mir

## 2024-06-20 NOTE — TELEPHONE ENCOUNTER
Attempted to call patient, no answer. Left VM to schedule his AWV as it is overdue.   Comment: Demetria OG Detail Level: Simple Render Risk Assessment In Note?: no Comment: Enlarge AK on left neck\\nLN2 today\\nWill biopsy if not gone next visit Comment: Patient defer numbing at this time\\nPatient aware without numbing it could be painful.

## 2024-06-21 ENCOUNTER — TELEPHONE (OUTPATIENT)
Age: 74
End: 2024-06-21

## 2024-06-21 NOTE — TELEPHONE ENCOUNTER
----- Message from Blaine Mir MD sent at 6/20/2024  1:08 PM EDT -----  Regarding: Please schedule patient for Medicare Annual Wellness Visit.  Please schedule patient for Medicare Annual Wellness Visit within the next 8 weeks. Also, please make sure patient's e-mail is on the chart and has completed [employer] sign-up. Thanks, Dr. Mir

## 2024-07-17 DIAGNOSIS — I10 ESSENTIAL HYPERTENSION: ICD-10-CM

## 2024-07-18 RX ORDER — AMLODIPINE BESYLATE 5 MG/1
10 TABLET ORAL DAILY
Qty: 200 TABLET | Refills: 2 | Status: SHIPPED | OUTPATIENT
Start: 2024-07-18

## 2024-09-06 ENCOUNTER — PATIENT MESSAGE (OUTPATIENT)
Age: 74
End: 2024-09-06

## 2024-09-06 ENCOUNTER — TELEPHONE (OUTPATIENT)
Age: 74
End: 2024-09-06

## 2024-09-06 DIAGNOSIS — I10 ESSENTIAL HYPERTENSION: Primary | ICD-10-CM

## 2024-09-06 DIAGNOSIS — R73.03 PREDIABETES: ICD-10-CM

## 2024-09-06 DIAGNOSIS — E78.2 MIXED HYPERLIPIDEMIA: ICD-10-CM

## 2024-09-06 NOTE — TELEPHONE ENCOUNTER
Patient in office scheduling his AWV. He is also wanting blood work scripts for before his appointment.     He is wanting to check on his cholesterol.    Dr. Mir, please advise when complete so I can let patient know.

## 2024-09-26 DIAGNOSIS — F41.1 GENERALIZED ANXIETY DISORDER: ICD-10-CM

## 2024-09-27 RX ORDER — VENLAFAXINE HYDROCHLORIDE 150 MG/1
150 CAPSULE, EXTENDED RELEASE ORAL DAILY
Qty: 30 CAPSULE | Refills: 11 | Status: SHIPPED | OUTPATIENT
Start: 2024-09-27

## 2024-10-07 ENCOUNTER — TELEPHONE (OUTPATIENT)
Age: 74
End: 2024-10-07

## 2024-10-07 NOTE — TELEPHONE ENCOUNTER
Hi Dr. Mir,    Patient came in requesting a referral to join the [facility] gym, it is for [employer] 3 months fitness membership to receive the $99 membership fee.    Miguel Ángel would like to be called once referral is in.  [phone]    Thanks.

## 2024-10-17 ENCOUNTER — TELEPHONE (OUTPATIENT)
Age: 74
End: 2024-10-17

## 2024-10-17 NOTE — TELEPHONE ENCOUNTER
Patient is in office, he is asking if you can order lab work for PSA. He stated he would like to go get lab work completed on Monday morning. Can you review?

## 2024-10-18 ENCOUNTER — APPOINTMENT (OUTPATIENT)
Dept: LAB | Facility: HOSPITAL | Age: 74
End: 2024-10-18
Attending: FAMILY MEDICINE
Payer: COMMERCIAL

## 2024-10-18 DIAGNOSIS — E78.2 MIXED HYPERLIPIDEMIA: ICD-10-CM

## 2024-10-18 DIAGNOSIS — Z12.11 SCREENING FOR COLORECTAL CANCER: ICD-10-CM

## 2024-10-18 DIAGNOSIS — K21.9 GASTROESOPHAGEAL REFLUX DISEASE WITHOUT ESOPHAGITIS: Primary | ICD-10-CM

## 2024-10-18 DIAGNOSIS — Z12.12 SCREENING FOR COLORECTAL CANCER: ICD-10-CM

## 2024-10-18 DIAGNOSIS — Z12.5 PROSTATE CANCER SCREENING: ICD-10-CM

## 2024-10-18 DIAGNOSIS — I10 ESSENTIAL HYPERTENSION: ICD-10-CM

## 2024-10-18 DIAGNOSIS — R73.03 PREDIABETES: ICD-10-CM

## 2024-10-18 LAB
ALBUMIN SERPL BCG-MCNC: 4.4 G/DL (ref 3.5–5)
ALP SERPL-CCNC: 59 U/L (ref 34–104)
ALT SERPL W P-5'-P-CCNC: 79 U/L (ref 7–52)
ANION GAP SERPL CALCULATED.3IONS-SCNC: 8 MMOL/L (ref 4–13)
AST SERPL W P-5'-P-CCNC: 74 U/L (ref 13–39)
BILIRUB SERPL-MCNC: 1.55 MG/DL (ref 0.2–1)
BUN SERPL-MCNC: 18 MG/DL (ref 5–25)
CALCIUM SERPL-MCNC: 9.6 MG/DL (ref 8.4–10.2)
CHLORIDE SERPL-SCNC: 102 MMOL/L (ref 96–108)
CHOLEST SERPL-MCNC: 124 MG/DL
CO2 SERPL-SCNC: 25 MMOL/L (ref 21–32)
CREAT SERPL-MCNC: 1.02 MG/DL (ref 0.6–1.3)
EST. AVERAGE GLUCOSE BLD GHB EST-MCNC: 166 MG/DL
GFR SERPL CREATININE-BSD FRML MDRD: 72 ML/MIN/1.73SQ M
GLUCOSE P FAST SERPL-MCNC: 173 MG/DL (ref 65–99)
HBA1C MFR BLD: 7.4 %
HDLC SERPL-MCNC: 37 MG/DL
LDLC SERPL CALC-MCNC: 57 MG/DL (ref 0–100)
NONHDLC SERPL-MCNC: 87 MG/DL
POTASSIUM SERPL-SCNC: 4.6 MMOL/L (ref 3.5–5.3)
PROT SERPL-MCNC: 8.4 G/DL (ref 6.4–8.4)
SODIUM SERPL-SCNC: 135 MMOL/L (ref 135–147)
TRIGL SERPL-MCNC: 150 MG/DL

## 2024-10-18 PROCEDURE — 36415 COLL VENOUS BLD VENIPUNCTURE: CPT

## 2024-10-18 PROCEDURE — 80061 LIPID PANEL: CPT

## 2024-10-18 PROCEDURE — 83036 HEMOGLOBIN GLYCOSYLATED A1C: CPT

## 2024-10-18 PROCEDURE — 80053 COMPREHEN METABOLIC PANEL: CPT

## 2024-10-19 DIAGNOSIS — E78.2 MIXED HYPERLIPIDEMIA: ICD-10-CM

## 2024-10-21 RX ORDER — ATORVASTATIN CALCIUM 40 MG/1
40 TABLET, FILM COATED ORAL DAILY
Qty: 30 TABLET | Refills: 0 | Status: SHIPPED | OUTPATIENT
Start: 2024-10-21

## 2024-10-30 ENCOUNTER — RA CDI HCC (OUTPATIENT)
Dept: OTHER | Facility: HOSPITAL | Age: 74
End: 2024-10-30

## 2024-11-01 LAB
LEFT EYE DIABETIC RETINOPATHY: NORMAL
RIGHT EYE DIABETIC RETINOPATHY: NORMAL

## 2024-11-06 ENCOUNTER — OFFICE VISIT (OUTPATIENT)
Age: 74
End: 2024-11-06

## 2024-11-06 VITALS
HEIGHT: 70 IN | TEMPERATURE: 97.3 F | DIASTOLIC BLOOD PRESSURE: 70 MMHG | BODY MASS INDEX: 31.07 KG/M2 | OXYGEN SATURATION: 97 % | RESPIRATION RATE: 20 BRPM | WEIGHT: 217 LBS | SYSTOLIC BLOOD PRESSURE: 106 MMHG | HEART RATE: 93 BPM

## 2024-11-06 DIAGNOSIS — R73.03 PREDIABETES: ICD-10-CM

## 2024-11-06 DIAGNOSIS — N52.8 OTHER MALE ERECTILE DYSFUNCTION: ICD-10-CM

## 2024-11-06 DIAGNOSIS — E78.2 MIXED HYPERLIPIDEMIA: ICD-10-CM

## 2024-11-06 DIAGNOSIS — F10.11 HISTORY OF ALCOHOL ABUSE: ICD-10-CM

## 2024-11-06 DIAGNOSIS — E08.9 DIABETES MELLITUS DUE TO UNDERLYING CONDITION WITHOUT COMPLICATION, WITHOUT LONG-TERM CURRENT USE OF INSULIN (HCC): ICD-10-CM

## 2024-11-06 DIAGNOSIS — N40.1 BENIGN PROSTATIC HYPERPLASIA WITH URINARY FREQUENCY: ICD-10-CM

## 2024-11-06 DIAGNOSIS — F43.11 ACUTE POST-TRAUMATIC STRESS DISORDER: ICD-10-CM

## 2024-11-06 DIAGNOSIS — K76.0 HEPATIC STEATOSIS: ICD-10-CM

## 2024-11-06 DIAGNOSIS — R35.0 BENIGN PROSTATIC HYPERPLASIA WITH URINARY FREQUENCY: ICD-10-CM

## 2024-11-06 DIAGNOSIS — I10 ESSENTIAL HYPERTENSION: ICD-10-CM

## 2024-11-06 DIAGNOSIS — Z00.00 MEDICARE ANNUAL WELLNESS VISIT, SUBSEQUENT: Primary | ICD-10-CM

## 2024-11-06 DIAGNOSIS — F41.1 GENERALIZED ANXIETY DISORDER: ICD-10-CM

## 2024-11-06 DIAGNOSIS — E09.9 DRUG OR CHEMICAL INDUCED DIABETES MELLITUS WITHOUT COMPLICATION, WITHOUT LONG-TERM CURRENT USE OF INSULIN (HCC): ICD-10-CM

## 2024-11-06 PROBLEM — E11.9 DIABETES MELLITUS WITHOUT COMPLICATION, WITHOUT LONG-TERM CURRENT USE OF INSULIN (HCC): Status: ACTIVE | Noted: 2024-11-06

## 2024-11-06 PROCEDURE — G0439 PPPS, SUBSEQ VISIT: HCPCS | Performed by: FAMILY MEDICINE

## 2024-11-06 RX ORDER — METFORMIN HYDROCHLORIDE 500 MG/1
500 TABLET, EXTENDED RELEASE ORAL
Qty: 30 TABLET | Refills: 3 | Status: SHIPPED | OUTPATIENT
Start: 2024-11-06

## 2024-11-06 RX ORDER — LORAZEPAM 1 MG/1
1 TABLET ORAL
COMMUNITY

## 2024-11-06 NOTE — PROGRESS NOTES
Ambulatory Visit  Name: Miguel Ángel Ramos      : 1950      MRN: [id number]  Encounter Provider: Blaine Mir MD  Encounter Date: 2024   Encounter department: [facility]    Assessment & Plan       Preventive health issues were discussed with patient, and age appropriate screening tests were ordered as noted in patient's After Visit Summary. Personalized health advice and appropriate referrals for health education or preventive services given if needed, as noted in patient's After Visit Summary.    History of Present Illness     Urinary Frequency   Associated symptoms include frequency.      Patient Care Team:  Blaine Mir MD as PCP - General (Family Medicine)  Blaine Mir MD as PCP - PCP-Health system ([facility])    Review of Systems   Genitourinary:  Positive for frequency.     Medical History Reviewed by provider this encounter:       Annual Wellness Visit Questionnaire   Miguel Ángel is here for his Subsequent Wellness visit. Last Medicare Wellness visit information reviewed, patient interviewed and updates made to the record today.      Health Risk Assessment:   Patient rates overall health as very good. Patient feels that their physical health rating is same. Patient is satisfied with their life. Eyesight was rated as slightly worse. Hearing was rated as slightly worse. Patient feels that their emotional and mental health rating is slightly better. Patients states they are never, rarely angry. Patient states they are often unusually tired/fatigued. Pain experienced in the last 7 days has been some. Patient's pain rating has been 6/10. Patient states that he has experienced weight loss or gain in last 6 months. Gained 10+ lbs    Depression Screening:   PHQ-2 Score: 1      Fall Risk Screening:   In the past year, patient has experienced: no history of falling in past year      Home Safety:  Patient does not have trouble with stairs inside or outside of their home.  Patient has working smoke alarms and has working carbon monoxide detector. Home safety hazards include: none.     Nutrition:   Current diet is Regular.     Medications:   Patient is not currently taking any over-the-counter supplements. Patient is able to manage medications.     Activities of Daily Living (ADLs)/Instrumental Activities of Daily Living (IADLs):   Walk and transfer into and out of bed and chair?: Yes  Dress and groom yourself?: Yes    Bathe or shower yourself?: Yes    Feed yourself? Yes  Do your laundry/housekeeping?: Yes  Manage your money, pay your bills and track your expenses?: Yes  Make your own meals?: Yes    Do your own shopping?: Yes    Previous Hospitalizations:   Any hospitalizations or ED visits within the last 12 months?: No      PREVENTIVE SCREENINGS      Cardiovascular Screening:    General: Screening Not Indicated and History Lipid Disorder      Diabetes Screening:     General: Screening Current      Abdominal Aortic Aneurysm (AAA) Screening:    Risk factors include: age between 65-76 yo        Lung Cancer Screening:     General: Screening Not Indicated      Hepatitis C Screening:    General: Screening Current    Screening, Brief Intervention, and Referral to Treatment (SBIRT)    Screening  Typical number of drinks in a day: 0  Typical number of drinks in a week: 0  Interpretation: Low risk drinking behavior.    Single Item Drug Screening:  How often have you used an illegal drug (including marijuana) or a prescription medication for non-medical reasons in the past year? never    Single Item Drug Screen Score: 0  Interpretation: Negative screen for possible drug use disorder    Social Determinants of Health     Financial Resource Strain: Low Risk  (5/25/2023)    Overall Financial Resource Strain (CARDIA)    • Difficulty of Paying Living Expenses: Not very hard   Food Insecurity: No Food Insecurity (8/18/2021)    Hunger Vital Sign    • Worried About Running Out of Food in the Last Year:  "Never true    • Ran Out of Food in the Last Year: Never true   Transportation Needs: No Transportation Needs (5/25/2023)    PRAPARE - Transportation    • Lack of Transportation (Medical): No    • Lack of Transportation (Non-Medical): No   Housing Stability: Unknown (8/18/2021)    Housing Stability Vital Sign    • Unable to Pay for Housing in the Last Year: No    • Unstable Housing in the Last Year: No     No results found.    Objective     /70 (BP Location: Left arm, Patient Position: Sitting, Cuff Size: Large)   Pulse 93   Temp (!) 97.3 °F (36.3 °C) (Temporal)   Resp 20   Ht 5' 10\" (1.778 m)   Wt 98.4 kg (217 lb)   SpO2 97%   BMI 31.14 kg/m²     Physical Exam    "

## 2024-11-06 NOTE — PATIENT INSTRUCTIONS
Dealing with Weight Control Concerns  Principles and Resources    Introduction:     The problem: 60% of Americans are now overweight or obese. Children are especially hit hard by this epidemic, and are acquiring 'adult' obesity-related diseases such as Type 2 diabetes mellitus at an early age unheard of even 30 years ago. Obesity is now by some calculations the number one preventable cause of disease and death in this country.  Many of those suffering from obesity spend their lives going on or off various diets in a desperate attempt to finally keep off the pounds. Such attempts are ultimately futile, since 'diets' (defined as any unusual way of eating that is non-sustainable and will eventually be abandoned) have been shown scientifically to not work. If 'diets' don't work, what does?  Why is losing weight so difficult?    Weight control on one level is simple - just take in (and absorb) fewer calories than you expend and the laws of thermodynamics guarantee you will lose weight.  However, simple does not necessarily mean easy. Many aspects of our society predispose to excess weight gain. These include:  Ubiquitous advertising and easy accessibility to high calorie but low nutrient-dense foods.   Distortion in portion sizes is a major problem, with excess serving sizes that have often doubled in the last 20 years.   More Americans are eating out - restaurant food is generally designed for entertainment, not health. Portions (and calories) are often excessive.  Low fiber foods and completing a meal faster than 20 minutes prevent proper feedback from the stomach and brain of when to stop eating.   Fast absorbed (high glycemic index) carbohydrates, such as commercial flour products, which induce overeating.    A sedentary lifestyle also impairs the body's ability to balance calories.   Excess stress and lack of life balance predispose all of us to 'emotional eating' of 'comfort foods' that pack in the calories.    Cultural attitudes towards food, especially early childhood experiences can deeply embed behavior that is resistant to future change.   A family tendency towards obesity (genetic and congenital factors) is often expressed in an environment of excess.   Only uncommonly does an undiagnosed medical problem such as hypothyroidism turn out to be the cause of the weight gain. Certain medications however can predispose to weight gain - ask your doctor.   The totality of all these factors makes it very difficult for most people to lose and then maintain proper weight. Excess weight is a symptom of unhealthy eating/ lack of fitness. Focus on changing the underlying cause, not on the weight itself. When fitness is restored and sustainable healthy eating patterns are established, the weight will come down naturally.    The solution is simple (but not necessarily easy): adopt a whole foods style of eating high nutrient/calorie ratio food and exercise at least one hour daily or almost daily, and do this for the rest of your life.       Details on this healthy lifestyle are listed below. Only scientifically validated concepts are included in the specific food recommendations listed in this section:  ? Eat mainly whole foods that are rich in low calorie, nutrient dense foods such as vegetables and whole fruit. A whole food is any food as it is found in nature with no or minimal processing, or traditional foods like bread whose ingredients are minimally processed. Examples:   Whole food Partly Processed food (eat sparingly) Heavily processed food (avoid)   Apple, other whole fruit Apple juice Apple Pop tarts, etc.   Whole grains such as Basmati brown rice, barley, quinoa, steel cut oatmeal  Whole grain pasta, 100% stone-ground or sprouted grain whole wheat (or other grain) bread.  (minimize whole wheat bread that is finely milled & feels 'squishy' when compress it); white rice White bread, bagels, pastries, pizza, many commercial  breakfast cereals, pasta made from white flour   Whole vegetables like broccoli, spinach, yams, etc. Veggie smoothies Veggie chips or sticks, Nutraceutical or food bars   Lentil (Kike), whole soybean & other bean dishes Foods made with processed soybean, e.g. 'textured vegetable protein' Bean chips     ? At least three quarters of your plate should be plant food, (<= 1/4 animal products), and should include at least 3-6 servings of vegetables (1 serving = 1 cup raw or ½ cup cooked) and 3-4 whole fruit per day:    HH1 [id number] (ARWEIGHTCONTROL)    ? Eat as many raw vegetables (such as in a salad) as you can every day. Eat these first in all meals. Use only a small amount of low fat dressing.  ? Eat liberal amounts of cooked vegetables, including at least 1 serving of cooked greens daily, e.g. kale, collards, spinach, broccoli.  ? Eat at least 2 whole fruit (not fruit juice or drink) per day  ? Practice cooking vegetarian main dishes, so you reduce reliance on animal products. See below 'Vegetarian cooking for everyone' and similar cookbooks.  ? Always spend at least 20 minutes eating all meals. This allows time for the stomach to feel full.  ? Don't drink your calories - the body can't balance it's energy if you do. Don't drink regular or diet soda, commercial ice tea or similar; large (> 4 oz.) of juice or milk per day  ? Include moderate amounts of low glycemic index, high fiber carbohydrate sources such as beans, squash and whole grain cereals (as opposed to finely milled flour products, potatoes and white rice). (See separate handouts). Keep average glycemic index < 60.  Avoid all white flour products whenever you can.  ? Avoid excess and unhealthy Trans and saturated fats: Limit frying. Trim visible fat from meats and choose only the leanest varieties (e.g. those that end with '-loin'). Avoid 'industrially produced' meats, such as 'corn-fed steers'. Cows are designed to graze, not  a crowded feedlot and eat  corn, which makes them (and you!) fat and sick. Choose white meat of poultry without the skin. Fresh fish is a healthy choice. Use cheese only as a condiment, not as a central part of a meal. Use butter and margarine sparingly. Limit heavy sauces and gravies. Limit even low fat beef and poultry to one or two servings a week total. Fish can be eaten up to 2 - 4 servings per week.  ? When eating out, choose the 'Heart healthy' or 'low fat' offerings. Avoid cheap fast food restaurants and 'all you can eat' buffets. Even salad bars can lead to overdoing the calories if you pile up your plate with non-vegetable offerings and dressings. If the portion is excessive, have the excess packaged for a second home meal or give some to a  before you start eating.  (See separate handout on tips for eating out healthily)  Re-train yourself to eat proper serving sizes of all foods (besides vegetables & whole fruit, which should be eaten in abundance).  This is necessary because of the ubiquitous 'portion distortion' that has normalized excess portion sizes in our society.  Obtain a portion-control diet plate set by going to www.thedietplate.com or calling 1 575.479.1942. Use it.  Take the 'Portion distortion' quizzes: http://mr1750.nhlbihin.net/portion/  Calorie awareness - learn to make low calorie but high nutrient choices. Choose foods that have more grams than calories (i.e.. G/Kcal > 1) per serving size.  The best references to learn this are the following books: 'Picture Perfect Weight Loss' and accompanying cookbook (see below). Teaches scientifically sound Food Awareness program using mainly pictures.  Adequate fiber/ bulk. This happens almost automatically if you eat whole foods.  'Volumetrics' is an excellent reference to support this concept (see below).  Pacing of Weight loss: 0.5 to 2 lbs/week. One needs to have a daily calorie deficit of 500 kcal to lose 1 lb. in a week. Trying to lose >2 lbs. /week is a  recipe for failure and increases chance of inadequate nutrient intake. An exception is very low calorie diets done under professional guidance in a structured program.  Avoid 'dieting'. There are new diets every week, with periodic fads such as Grapefruit diet, Vinegar diet, etc. Healthy nutrition is all about balance, variety & moderation. Especially avoid any diet that labels any one nutrient class (such as carbohydrates) as either 'bad' or 'exclusively good'. No unsustainable diet works, and they should all be ignored.  Decide on a reasonable quota of sweets and treats servings to eat per week (such as 3 to 5) and stick with it. (A serving is 1 slice of pie or cake, or 2 medium cookies). Make a little ritual out of eating these, doing nothing else at the time so you can savor your treat slowly and mindfully without any guilt.  Consider taking a multivitamin/multimineral daily supplement to ensure adequate nutrients while losing weight (scientific benefit not proven).  Exercise for at least 60 minutes daily or almost daily. Regular exercise has been demonstrated to be crucial for maintaining weight control. (see exercise handouts)  This should include at least moderate level aerobics (at least at the level of brisk walking - not just strolling).  Should include strength training for at least 20 minutes at least 2 - 3 alternate days/week.  While education on proper eating & exercise is important, this is often the easiest aspect of lifestyle change. To accomplish all the above, you need also to proactively organize your life to allow adequate time to shop for, prepare and enjoy healthy food as well as to exercise.  (See separate handout on food shopping tips). This usually means that you will have to give up something else, which in turn means you need to adjust your value system of what is important to you.   Develop the mental habit of planning how you are going to eat well and exercise at the beginning of every  week, with a quick review at the beginning of every day.   Such a change in values and lifestyle often demands an entire repertoire of 'meta-skills', which may include the following:  Assertiveness - the ability to say 'no' in a healthy way to people and situations that are not in line with your new healthy lifestyle.  Self-awareness - the ability to monitor how balanced your life is every day and, just like a musical instrument, to 're-tune' your life on a regular basis as the need arises. Staying centered helps one stay on goal.  Positive attitude - the ability to tackle the inevitable challenges, obstacles and setbacks involved in any lifestyle change with constructive action (or if this is not possible, then graceful acceptance).  Self-acceptance - the ability to unconditionally accept all sides of yourself while at the same time not indulging or getting lost in unhealthy emotional states that lead to counter-productive behaviors. This includes forgiving any lapses, instead spending your emotional energy getting back on track.   Healthy skepticism - the ability to critically assess the barrage of questionable health claims aimed at consumers, to detect 'saboteur foods' that pretend to be healthy but aren't and to cut thru market hype.  Self-discipline: the ability to resist 'comfort food' and sedentary temptations of the moment and keep a long view of what you really want.  Given all the skills required and the societal predisposition to weight gain on multiple levels, we should not be surprised that weight loss, while simple, is also difficult. So if you fall off your healthy lifestyle, don't get down on yourself. Give yourself a break and just get back on track as soon as you realize the need to do so. Do something positive for yourself everyday, even if it is not perfect.  Reward yourself in a healthy manner for your efforts. Enlist the moral support and coaching of trusted friends. Systems like Weight  Watchers may help.  Dedicate at least ½ hr. but better 1 hr. per day of guarded time each day to lifestyle change.     The secret is that there is no secret. There are a large number of skills you will have to learn to accomplish your goals of improved fitness and healthy eating. When you accomplish these, the weight will normalize by itself.  Accomplishing your goals requires discipline, hard work and sacrifice - don't let anyone tell you otherwise! Yet it can be immensely satisfying and fun.  Break it down into manageable short-term goals. Small but consistent steps are more important than brief intense efforts to change. Have a professional monitor your progress. Utilize the skills of qualified specialists such as Personal trainers, Wellness coaches and Nutritionists.  Don't try to go it alone, for the same reason you would not try to become a pianist or a doctor on your own. A support network of friends with a healthy lifestyle and professionals is often crucial to success.    Recommended Readings:   Eat to Live by Josh Gordon MD. [employer] 2003. Focuses on Nutrient/Calorie ratio as a key to successful weight loss.  The Volumetrics Weight-Control Plan by Vinh Islas. [employer] 2000. Very good book to understand how to use low calorie dense foods to lose weight and control hunger.   Picture-Perfect Weight loss (& cookbook) by Dr. Kyle Omalley. [employer]. 2000. Teaches calorie awareness thru pictures!  The Omnivore's dilemma and In Defense of Food by Gurdeep Levy Books. These books explain where our food comes from and the strengths, weaknesses and toxicities of American food culture.   Glycemic index cooking made easy by Linette Sandoval, Little Vaneg[employer] & Anya Griffin. [employer] 2007. Helps one put the Glycemic index concept in action, with lots of delicious recipes.  Vegetarian Cooking for Everyone by Nadine Broderick.  Publisher: [employer], 1997.  An excellent guide to cooking vegetables in interesting and tasty ways that can be used by vegetarians and non-vegetarians alike.  Mindless Eating by Ricki Gross, PhD. [employer], 2006. Explains how we all eat for reasons other than to satisfy hunger and what to do about it.  Fast food, Good food by Andrew Weil, Md.  [employer], 2015. Quick and easy recipes for busy people  [place] over Knives Cookbook by Del Sroufe [employer] 2012. A Vegan approach to healthy eating      Long term goals:   _______Increae veggies to at least 3 serving a day___________________________________________________  __________________________________________________________  __________________________________________________________    Short term goals:  ___________________________________________________________  ___________________________________________________________  ___________________________________________________________      © 3/ 2008  Blaine Mir MD  Board certified, Family, Sports and Integrative medicine.  Revised 8/2019         Medicare Preventive Visit Patient Instructions  Thank you for completing your Welcome to Medicare Visit or Medicare Annual Wellness Visit today. Your next wellness visit will be due in one year (11/7/2025).  The screening/preventive services that you may require over the next 5-10 years are detailed below. Some tests may not apply to you based off risk factors and/or age. Screening tests ordered at today's visit but not completed yet may show as past due. Also, please note that scanned in results may not display below.  Preventive Screenings:  Service Recommendations Previous Testing/Comments   Colorectal Cancer Screening  Colonoscopy    Fecal Occult Blood Test (FOBT)/Fecal Immunochemical Test (FIT)  Fecal DNA/Cologuard Test  Flexible Sigmoidoscopy Age: 45-75 years old   Colonoscopy: every 10 years (May be performed more frequently if at  higher risk)  OR  FOBT/FIT: every 1 year  OR  Cologuard: every 3 years  OR  Sigmoidoscopy: every 5 years  Screening may be recommended earlier than age 45 if at higher risk for colorectal cancer. Also, an individualized decision between you and your healthcare provider will decide whether screening between the ages of 76-85 would be appropriate. Colonoscopy: Not on file  FOBT/FIT: Not on file  Cologuard: Not on file  Sigmoidoscopy: Not on file          Prostate Cancer Screening Individualized decision between patient and health care provider in men between ages of 55-69   Medicare will cover every 12 months beginning on the day after your 50th birthday PSA: 1.1 ng/mL           Hepatitis C Screening Once for adults born between 1945 and 1965  More frequently in patients at high risk for Hepatitis C Hep C Antibody: 06/30/2020        Diabetes Screening 1-2 times per year if you're at risk for diabetes or have pre-diabetes Fasting glucose: 173 mg/dL (10/18/2024)  A1C: 7.4 % (10/18/2024)      Cholesterol Screening Once every 5 years if you don't have a lipid disorder. May order more often based on risk factors. Lipid panel: 10/18/2024         Other Preventive Screenings Covered by Medicare:  Abdominal Aortic Aneurysm (AAA) Screening: covered once if your at risk. You're considered to be at risk if you have a family history of AAA or a male between the age of 65-75 who smoking at least 100 cigarettes in your lifetime.  Lung Cancer Screening: covers low dose CT scan once per year if you meet all of the following conditions: (1) Age 55-77; (2) No signs or symptoms of lung cancer; (3) Current smoker or have quit smoking within the last 15 years; (4) You have a tobacco smoking history of at least 20 pack years (packs per day x number of years you smoked); (5) You get a written order from a healthcare provider.  Glaucoma Screening: covered annually if you're considered high risk: (1) You have diabetes OR (2) Family history of  glaucoma OR (3)  aged 50 and older OR (4)  American aged 65 and older  Osteoporosis Screening: covered every 2 years if you meet one of the following conditions: (1) Have a vertebral abnormality; (2) On glucocorticoid therapy for more than 3 months; (3) Have primary hyperparathyroidism; (4) On osteoporosis medications and need to assess response to drug therapy.  HIV Screening: covered annually if you're between the age of 15-65. Also covered annually if you are younger than 15 and older than 65 with risk factors for HIV infection. For pregnant patients, it is covered up to 3 times per pregnancy.    Immunizations:  Immunization Recommendations   Influenza Vaccine Annual influenza vaccination during flu season is recommended for all persons aged >= 6 months who do not have contraindications   Pneumococcal Vaccine   * Pneumococcal conjugate vaccine = PCV13 (Prevnar 13), PCV15 (Vaxneuvance), PCV20 (Prevnar 20)  * Pneumococcal polysaccharide vaccine = PPSV23 (Pneumovax) Adults 19-65 yo with certain risk factors or if 65+ yo  If never received any pneumonia vaccine: recommend Prevnar 20 (PCV20)  Give PCV20 if previously received 1 dose of PCV13 or PPSV23   Hepatitis B Vaccine 3 dose series if at intermediate or high risk (ex: diabetes, end stage renal disease, liver disease)   Respiratory syncytial virus (RSV) Vaccine - COVERED BY MEDICARE PART D  * RSVPreF3 (Arexvy) CDC recommends that adults 60 years of age and older may receive a single dose of RSV vaccine using shared clinical decision-making (SCDM)   Tetanus (Td) Vaccine - COST NOT COVERED BY MEDICARE PART B Following completion of primary series, a booster dose should be given every 10 years to maintain immunity against tetanus. Td may also be given as tetanus wound prophylaxis.   Tdap Vaccine - COST NOT COVERED BY MEDICARE PART B Recommended at least once for all adults. For pregnant patients, recommended with each pregnancy.   Shingles  Vaccine (Shingrix) - COST NOT COVERED BY MEDICARE PART B  2 shot series recommended in those 19 years and older who have or will have weakened immune systems or those 50 years and older     Health Maintenance Due:      Topic Date Due    Colorectal Cancer Screening  Never done    Hepatitis C Screening  Completed     Immunizations Due:      Topic Date Due    COVID-19 Vaccine (4 - 2023-24 season) 09/01/2024     Advance Directives   What are advance directives?  Advance directives are legal documents that state your wishes and plans for medical care. These plans are made ahead of time in case you lose your ability to make decisions for yourself. Advance directives can apply to any medical decision, such as the treatments you want, and if you want to donate organs.   What are the types of advance directives?  There are many types of advance directives, and each state has rules about how to use them. You may choose a combination of any of the following:  Living will:  This is a written record of the treatment you want. You can also choose which treatments you do not want, which to limit, and which to stop at a certain time. This includes surgery, medicine, IV fluid, and tube feedings.   Durable power of  for healthcare (DPAHC):  This is a written record that states who you want to make healthcare choices for you when you are unable to make them for yourself. This person, called a proxy, is usually a family member or a friend. You may choose more than 1 proxy.  Do not resuscitate (DNR) order:  A DNR order is used in case your heart stops beating or you stop breathing. It is a request not to have certain forms of treatment, such as CPR. A DNR order may be included in other types of advance directives.  Medical directive:  This covers the care that you want if you are in a coma, near death, or unable to make decisions for yourself. You can list the treatments you want for each condition. Treatment may include pain  medicine, surgery, blood transfusions, dialysis, IV or tube feedings, and a ventilator (breathing machine).  Values history:  This document has questions about your views, beliefs, and how you feel and think about life. This information can help others choose the care that you would choose.  Why are advance directives important?  An advance directive helps you control your care. Although spoken wishes may be used, it is better to have your wishes written down. Spoken wishes can be misunderstood, or not followed. Treatments may be given even if you do not want them. An advance directive may make it easier for your family to make difficult choices about your care.   Weight Management   Why it is important to manage your weight:  Being overweight increases your risk of health conditions such as heart disease, high blood pressure, type 2 diabetes, and certain types of cancer. It can also increase your risk for osteoarthritis, sleep apnea, and other respiratory problems. Aim for a slow, steady weight loss. Even a small amount of weight loss can lower your risk of health problems.  How to lose weight safely:  A safe and healthy way to lose weight is to eat fewer calories and get regular exercise. You can lose up about 1 pound a week by decreasing the number of calories you eat by 500 calories each day.   Healthy meal plan for weight management:  A healthy meal plan includes a variety of foods, contains fewer calories, and helps you stay healthy. A healthy meal plan includes the following:  Eat whole-grain foods more often.  A healthy meal plan should contain fiber. Fiber is the part of grains, fruits, and vegetables that is not broken down by your body. Whole-grain foods are healthy and provide extra fiber in your diet. Some examples of whole-grain foods are whole-wheat breads and pastas, oatmeal, brown rice, and bulgur.  Eat a variety of vegetables every day.  Include dark, leafy greens such as spinach, kale, rocky  greens, and mustard greens. Eat yellow and orange vegetables such as carrots, sweet potatoes, and winter squash.   Eat a variety of fruits every day.  Choose fresh or canned fruit (canned in its own juice or light syrup) instead of juice. Fruit juice has very little or no fiber.  Eat low-fat dairy foods.  Drink fat-free (skim) milk or 1% milk. Eat fat-free yogurt and low-fat cottage cheese. Try low-fat cheeses such as mozzarella and other reduced-fat cheeses.  Choose meat and other protein foods that are low in fat.  Choose beans or other legumes such as split peas or lentils. Choose fish, skinless poultry (chicken or turkey), or lean cuts of red meat (beef or pork). Before you cook meat or poultry, cut off any visible fat.   Use less fat and oil.  Try baking foods instead of frying them. Add less fat, such as margarine, sour cream, regular salad dressing and mayonnaise to foods. Eat fewer high-fat foods. Some examples of high-fat foods include french fries, doughnuts, ice cream, and cakes.  Eat fewer sweets.  Limit foods and drinks that are high in sugar. This includes candy, cookies, regular soda, and sweetened drinks.  Exercise:  Exercise at least 30 minutes per day on most days of the week. Some examples of exercise include walking, biking, dancing, and swimming. You can also fit in more physical activity by taking the stairs instead of the elevator or parking farther away from stores. Ask your healthcare provider about the best exercise plan for you.      © Copyright [employer] 2018 Information is for End User's use only and may not be sold, redistributed or otherwise used for commercial purposes. All illustrations and images included in CareNotes® are the copyrighted property of [employer] Inc[employer] or [employer]

## 2024-11-07 DIAGNOSIS — E78.2 MIXED HYPERLIPIDEMIA: ICD-10-CM

## 2024-11-07 NOTE — PROGRESS NOTES
Diabetic Foot Exam    Patient's shoes and socks removed.    Right Foot/Ankle   Right Foot Inspection  Skin Exam: skin normal and skin intact. No dry skin, no warmth, no callus, no erythema, no maceration, no abnormal color, no pre-ulcer, no ulcer and no callus.     Toe Exam: ROM and strength within normal limits.     Sensory   Vibration: intact  Monofilament testing: intact    Vascular  Capillary refills: < 3 seconds  The right DP pulse is 1+.     Left Foot/Ankle  Left Foot Inspection  Skin Exam: skin normal and skin intact. No dry skin, no warmth, no erythema, no maceration, normal color, no pre-ulcer, no ulcer and no callus.     Toe Exam: ROM and strength within normal limits.     Sensory   Vibration: intact  Monofilament testing: intact    Vascular  Capillary refills: < 3 seconds  The left DP pulse is 1+.     Assign Risk Category  No deformity present  No loss of protective sensation  No weak pulses  Risk: 0

## 2024-11-08 RX ORDER — ATORVASTATIN CALCIUM 40 MG/1
40 TABLET, FILM COATED ORAL DAILY
Qty: 90 TABLET | Refills: 3 | Status: SHIPPED | OUTPATIENT
Start: 2024-11-08

## 2024-12-18 ENCOUNTER — OFFICE VISIT (OUTPATIENT)
Age: 74
End: 2024-12-18

## 2024-12-18 ENCOUNTER — TELEPHONE (OUTPATIENT)
Dept: ADMINISTRATIVE | Facility: OTHER | Age: 74
End: 2024-12-18

## 2024-12-18 VITALS
WEIGHT: 214.8 LBS | HEIGHT: 70 IN | DIASTOLIC BLOOD PRESSURE: 77 MMHG | OXYGEN SATURATION: 94 % | HEART RATE: 79 BPM | SYSTOLIC BLOOD PRESSURE: 135 MMHG | BODY MASS INDEX: 30.75 KG/M2 | TEMPERATURE: 98.2 F

## 2024-12-18 DIAGNOSIS — E08.9 DIABETES MELLITUS DUE TO UNDERLYING CONDITION WITHOUT COMPLICATION, WITHOUT LONG-TERM CURRENT USE OF INSULIN (HCC): Primary | ICD-10-CM

## 2024-12-18 DIAGNOSIS — K21.9 GASTROESOPHAGEAL REFLUX DISEASE WITHOUT ESOPHAGITIS: ICD-10-CM

## 2024-12-18 DIAGNOSIS — E78.2 MIXED HYPERLIPIDEMIA: ICD-10-CM

## 2024-12-18 DIAGNOSIS — Z23 ENCOUNTER FOR IMMUNIZATION: ICD-10-CM

## 2024-12-18 DIAGNOSIS — J45.20 MILD INTERMITTENT ASTHMA WITHOUT COMPLICATION: ICD-10-CM

## 2024-12-18 PROCEDURE — G2211 COMPLEX E/M VISIT ADD ON: HCPCS | Performed by: FAMILY MEDICINE

## 2024-12-18 PROCEDURE — 99214 OFFICE O/P EST MOD 30 MIN: CPT | Performed by: FAMILY MEDICINE

## 2024-12-18 NOTE — TELEPHONE ENCOUNTER
Upon review of the In Basket request and the patient's chart, initial outreach has been made via fax to facility. Please see Contacts section for details.     Thank you  TONY IVAN MA

## 2024-12-18 NOTE — LETTER
Diabetic Eye Exam Form    Date Requested: 24  Patient: Miguel Ángel Ramos  Patient : 1950   Referring Provider: Franco Smith, DO      DIABETIC Eye Exam Date _______________________________      Type of Exam MUST be documented for Diabetic Eye Exams. Please CHECK ONE.     Retinal Exam       Dilated Retinal Exam       OCT       Optomap-Iris Exam      Fundus Photography       Left Eye - Please check Retinopathy or No Retinopathy        Exam did show retinopathy    Exam did not show retinopathy       Right Eye - Please check Retinopathy or No Retinopathy       Exam did show retinopathy    Exam did not show retinopathy       Comments __________________________________________________________    Practice Providing Exam ______________________________________________    Exam Performed By (print name) _______________________________________      Provider Signature ___________________________________________________      These reports are needed for  compliance.  Please fax this completed form and a copy of the Diabetic Eye Exam report to our office located at [address] [place] [address] as soon as possible via Fax [phone] attention Kerissa: Phone [phone]  We thank you for your assistance in treating our mutual patient.

## 2024-12-18 NOTE — LETTER
Diabetic Eye Exam Form    Date Requested: 24  Patient: Miguel Ángel Ramos  Patient : 1950   Referring Provider: Franco Smith, DO      DIABETIC Eye Exam Date _______________________________      Type of Exam MUST be documented for Diabetic Eye Exams. Please CHECK ONE.     Retinal Exam       Dilated Retinal Exam       OCT       Optomap-Iris Exam      Fundus Photography       Left Eye - Please check Retinopathy or No Retinopathy        Exam did show retinopathy    Exam did not show retinopathy       Right Eye - Please check Retinopathy or No Retinopathy       Exam did show retinopathy    Exam did not show retinopathy       Comments __________________________________________________________    Practice Providing Exam ______________________________________________    Exam Performed By (print name) _______________________________________      Provider Signature ___________________________________________________      These reports are needed for  compliance.  Please fax this completed form and a copy of the Diabetic Eye Exam report to our office located at [address] [place] [address] as soon as possible via Fax [phone] attention Kerissa: Phone [phone]  We thank you for your assistance in treating our mutual patient.

## 2024-12-19 NOTE — ASSESSMENT & PLAN NOTE
The patient has a history of mild intermittent asthma.  He does have an albuterol inhaler at home to use if symptomatic.  He states he rarely uses the inhaler.

## 2024-12-19 NOTE — PROGRESS NOTES
Name: Miguel Ángel Ramos      : 1950      MRN: [id number]  Encounter Provider: Franco Smith DO  Encounter Date: 2024   Encounter department: [facility]  :  Assessment & Plan  Diabetes mellitus due to underlying condition without complication, without long-term current use of insulin (HCC)  The patient's diabetes control is not as good as it was in the past.  He did have a past diagnosis of prediabetes but has been changed now to diabetes with the A1c at 7.4.  The patient does admit to not always taking his metformin 500 mg tablet daily.  He was recommended to take the tablet on a daily basis.  A repeat A1c would be done in 3 months with a follow-up appointment.  I did encourage the patient's to diet and exercise.  He did state he joined a gym to increase his exercise which should help his diabetes control.  We will do a point-of-care A1c in his follow-up appointment in 3 months.  Lab Results   Component Value Date    HGBA1C 7.4 (H) 10/18/2024            Mild intermittent asthma without complication  The patient has a history of mild intermittent asthma.  He does have an albuterol inhaler at home to use if symptomatic.  He states he rarely uses the inhaler.       Gastroesophageal reflux disease without esophagitis  The patient has a history of GERD which is stable with his PPI use.       Mixed hyperlipidemia  The patient has a history of hyperlipidemia.  His last lipid panel was at the end of October.  He does have some mild elevation in his liver function test which will be monitored.  Dietary restrictions in cholesterol and fat are encouraged.  We will discuss his next lipid panel in his follow-up appointment in 3 months.  He should continue to take his statin.  As prescribed.       The patient will follow-up with me in 3 months for his chronic medical problems.  We will do an A1c POC in the office at that time.  Diet and exercise should help his diabetes and  "hyperlipidemia.  I will continue to follow his liver function test also in the future.       History of Present Illness     This is a scheduled appointment for this 74-year-old male with past medical history of diabetes, hyperlipidemia, BPH, hypertension, and GERD.  The patient recently had a Medicare wellness exam.  He would like to review his medical problems and his medications for these medical problems.  He would also like to know if he is due for any blood work for his medical issues.  He denies any acute problems today.  He did note that his wife is in the hospital at this time because of chronic kidney disease.  She will be starting dialysis.      Review of Systems   HENT: Negative.     Eyes: Negative.    Respiratory: Negative.     Cardiovascular: Negative.    Gastrointestinal: Negative.    Endocrine: Negative.    Musculoskeletal: Negative.    Allergic/Immunologic: Negative.    Neurological: Negative.    Hematological: Negative.    Psychiatric/Behavioral: Negative.     All other systems reviewed and are negative.      Objective   /77 (BP Location: Left arm, Patient Position: Sitting, Cuff Size: Standard)   Pulse 79   Temp 98.2 °F (36.8 °C) (Tympanic)   Ht 5' 10\" (1.778 m)   Wt 97.4 kg (214 lb 12.8 oz)   SpO2 94%   BMI 30.82 kg/m²      Physical Exam  Constitutional:       Comments: The patient is mildly obese with a BMI of 30.82   Cardiovascular:      Rate and Rhythm: Normal rate and regular rhythm.      Heart sounds: Normal heart sounds.   Pulmonary:      Effort: Pulmonary effort is normal.      Breath sounds: Normal breath sounds.   Musculoskeletal:         General: Normal range of motion.   Neurological:      General: No focal deficit present.      Mental Status: He is alert and oriented to person, place, and time.   Psychiatric:         Mood and Affect: Mood normal.         Behavior: Behavior normal.         Thought Content: Thought content normal.         Judgment: Judgment normal.         "

## 2024-12-19 NOTE — ASSESSMENT & PLAN NOTE
The patient has a history of hyperlipidemia.  His last lipid panel was at the end of October.  He does have some mild elevation in his liver function test which will be monitored.  Dietary restrictions in cholesterol and fat are encouraged.  We will discuss his next lipid panel in his follow-up appointment in 3 months.  He should continue to take his statin.  As prescribed.

## 2024-12-19 NOTE — ASSESSMENT & PLAN NOTE
The patient's diabetes control is not as good as it was in the past.  He did have a past diagnosis of prediabetes but has been changed now to diabetes with the A1c at 7.4.  The patient does admit to not always taking his metformin 500 mg tablet daily.  He was recommended to take the tablet on a daily basis.  A repeat A1c would be done in 3 months with a follow-up appointment.  I did encourage the patient's to diet and exercise.  He did state he joined a gym to increase his exercise which should help his diabetes control.  We will do a point-of-care A1c in his follow-up appointment in 3 months.  Lab Results   Component Value Date    HGBA1C 7.4 (H) 10/18/2024

## 2024-12-26 NOTE — TELEPHONE ENCOUNTER
As a follow-up, a second attempt has been made for outreach via fax to facility. Please see Contacts section for details.    Thank you  TONY IVAN MA

## 2024-12-30 NOTE — TELEPHONE ENCOUNTER
Upon review of the In Basket request we were able to locate, review, and update the patient chart as requested for Diabetic Eye Exam.    Any additional questions or concerns should be emailed to the Practice Liaisons via the appropriate education email address, please do not reply via In Basket.    Thank you  TONY IVAN MA   [facility]

## 2025-01-14 DIAGNOSIS — E08.9 DIABETES MELLITUS DUE TO UNDERLYING CONDITION WITHOUT COMPLICATION, WITHOUT LONG-TERM CURRENT USE OF INSULIN (HCC): ICD-10-CM

## 2025-01-14 RX ORDER — METFORMIN HYDROCHLORIDE 500 MG/1
500 TABLET, EXTENDED RELEASE ORAL
Qty: 100 TABLET | Refills: 2 | Status: SHIPPED | OUTPATIENT
Start: 2025-01-14

## 2025-03-05 DIAGNOSIS — I10 ESSENTIAL HYPERTENSION: ICD-10-CM

## 2025-03-06 RX ORDER — LISINOPRIL 40 MG/1
40 TABLET ORAL DAILY
Qty: 100 TABLET | Refills: 2 | Status: SHIPPED | OUTPATIENT
Start: 2025-03-06

## 2025-04-02 DIAGNOSIS — I10 ESSENTIAL HYPERTENSION: ICD-10-CM

## 2025-04-03 RX ORDER — AMLODIPINE BESYLATE 5 MG/1
10 TABLET ORAL DAILY
Qty: 200 TABLET | Refills: 2 | Status: SHIPPED | OUTPATIENT
Start: 2025-04-03

## 2025-04-13 ENCOUNTER — OFFICE VISIT (OUTPATIENT)
Dept: URGENT CARE | Facility: CLINIC | Age: 75
End: 2025-04-13
Payer: COMMERCIAL

## 2025-04-13 VITALS
OXYGEN SATURATION: 94 % | WEIGHT: 212 LBS | BODY MASS INDEX: 30.42 KG/M2 | SYSTOLIC BLOOD PRESSURE: 109 MMHG | HEART RATE: 101 BPM | DIASTOLIC BLOOD PRESSURE: 62 MMHG | TEMPERATURE: 98.3 F | RESPIRATION RATE: 16 BRPM
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
## 2025-04-13 DIAGNOSIS — L08.9 LOCAL INFECTION OF THE SKIN AND SUBCUTANEOUS TISSUE, UNSPECIFIED: ICD-10-CM

## 2025-04-13 DIAGNOSIS — R05.3 CHRONIC COUGH: ICD-10-CM

## 2025-04-13 DIAGNOSIS — S61.451A DOG BITE, HAND, RIGHT, INITIAL ENCOUNTER: Primary | ICD-10-CM

## 2025-04-13 PROCEDURE — 90714 TD VACC NO PRESV 7 YRS+ IM: CPT

## 2025-04-13 PROCEDURE — 90471 IMMUNIZATION ADMIN: CPT

## 2025-04-13 PROCEDURE — 99204 OFFICE O/P NEW MOD 45 MIN: CPT | Performed by: PHYSICIAN ASSISTANT

## 2025-04-13 RX ORDER — DOXYCYCLINE 100 MG/1
100 CAPSULE ORAL 2 TIMES DAILY
Qty: 14 CAPSULE | Refills: 0 | Status: SHIPPED | OUTPATIENT
Start: 2025-04-13 | End: 2025-04-20

## 2025-04-13 RX ORDER — ACETAMINOPHEN 325 MG/1
325 TABLET ORAL EVERY 6 HOURS PRN
COMMUNITY

## 2025-04-13 RX ORDER — METRONIDAZOLE 500 MG/1
500 TABLET ORAL EVERY 8 HOURS SCHEDULED
Qty: 21 TABLET | Refills: 0 | Status: SHIPPED | OUTPATIENT
Start: 2025-04-13 | End: 2025-04-20

## 2025-04-13 NOTE — PROGRESS NOTES
[facility]        NAME: Miguel Ángel Ramos is a 75 y.o. male  : 1950    MRN: [id number]  DATE: 2025  TIME: 11:08 AM    Assessment and Plan   Dog bite, hand, right, initial encounter [S61.451A, W54.0XXA]  1. Dog bite, hand, right, initial encounter  Td Vaccine (greater than or equal to 6yo) Preservative Free IM    doxycycline monohydrate (MONODOX) 100 mg capsule    metroNIDAZOLE (FLAGYL) 500 mg tablet      2. Local infection of the skin and subcutaneous tissue, unspecified  doxycycline monohydrate (MONODOX) 100 mg capsule    metroNIDAZOLE (FLAGYL) 500 mg tablet      3. Chronic cough  Ambulatory Referral to Pulmonology        Recommend xr however pt declined. Discussed wound care precautions and possible s/es of medications. Discussed strict return to care precautions as well as red flag symptoms which should prompt immediate ED referral. Pt verbalized understanding and is in agreement with plan.  Please follow up with your primary care provider within the next week. Please remember that your visit today was with an urgent care provider and should not replace follow up with your primary care provider for chronic medical issues or annual physicals.     Pt has h/o COPD and ventilation pneumonitis. SpO2 ranging from 93-94%. He endorses chronic cough and chronic sob with activities such as climbing stairs and mowing the lawn. Will place pulm referral for further eval and management.      Patient Instructions       Follow up with PCP in 3-5 days.  Proceed to  ER if symptoms worsen.    If tests are performed, our office will contact you with results only if changes need to made to the care plan discussed with you at the visit. You can review your full results on [facility].    Chief Complaint     Chief Complaint   Patient presents with    Animal Bite     Reports being bitten on the Right hand by his daughter dog late Friday afternoon. States the hand began to swell yesterday. Cleaned with  hydrogen peroxide. States the dog is up to date on immunizations, including rabies.          History of Present Illness       Pt is a 74 yo male with pmh COPD, DM2 pw dog bite to R hand since 4/11/25. States he was playing tug with his daughter's dog and the dog accidentally bit him. Cleaned it out with peroxide. Noticed yesterday it got more swollen and erythematous and he was able to express some pus. No fevers, numbness/tingling. Dog is up to date on vaccines. Pt's last tetanus was in 2017.         Review of Systems   Review of Systems   Constitutional:  Negative for chills and fever.   Respiratory:  Negative for shortness of breath.    Cardiovascular:  Negative for chest pain.   Gastrointestinal:  Negative for nausea and vomiting.   Musculoskeletal:  Negative for arthralgias, back pain, gait problem, joint swelling and neck pain.   Skin:  Positive for wound. Negative for color change.   Neurological:  Negative for weakness and numbness.         Current Medications       Current Outpatient Medications:     acetaminophen (TYLENOL) 325 mg tablet, Take 325 mg by mouth every 6 (six) hours as needed for mild pain, Disp: , Rfl:     albuterol (PROVENTIL HFA,VENTOLIN HFA) 90 mcg/act inhaler, USE 2 INHALATIONS BY MOUTH EVERY 6 HOURS AS NEEDED FOR WHEEZING, Disp: 36 g, Rfl: 6    amLODIPine (NORVASC) 5 mg tablet, TAKE 2 TABLETS BY MOUTH DAILY, Disp: 200 tablet, Rfl: 2    atorvastatin (LIPITOR) 40 mg tablet, TAKE 1 TABLET BY MOUTH DAILY, Disp: 90 tablet, Rfl: 3    Blood Pressure Monitoring (B-D ASSURE BPM/AUTO ARM CUFF) MISC, Check BP regularly. Keep BP less than 140/90, Disp: 1 each, Rfl: 0    Blood Pressure Monitoring (BLOOD PRESSURE CUFF) [facility], Check BP a few times a week, Disp: 1 each, Rfl: 0    doxycycline monohydrate (MONODOX) 100 mg capsule, Take 1 capsule (100 mg total) by mouth 2 (two) times a day for 7 days, Disp: 14 capsule, Rfl: 0    lisinopril (ZESTRIL) 40 mg tablet, TAKE 1 TABLET BY MOUTH DAILY, Disp: 100  tablet, Rfl: 2    metFORMIN (GLUCOPHAGE-XR) 500 mg 24 hr tablet, TAKE 1 TABLET BY MOUTH DAILY  WITH DINNER, Disp: 100 tablet, Rfl: 2    metroNIDAZOLE (FLAGYL) 500 mg tablet, Take 1 tablet (500 mg total) by mouth every 8 (eight) hours for 7 days DO NOT drink alcohol while on this medication and for 3 days after finishing, Disp: 21 tablet, Rfl: 0    venlafaxine (EFFEXOR-XR) 150 mg 24 hr capsule, TAKE 1 CAPSULE BY MOUTH DAILY, Disp: 30 capsule, Rfl: 11    LORazepam (ATIVAN) 1 mg tablet, Take 1 mg by mouth daily at bedtime as needed for anxiety (Patient not taking: Reported on 4/13/2025), Disp: , Rfl:     Current Allergies     Allergies as of 04/13/2025 - Reviewed 04/13/2025   Allergen Reaction Noted    Amoxicillin  12/17/2018    Dust mite extract  12/19/2013    Other Allergic Rhinitis 12/19/2013    Oxycodone  01/10/2014            The following portions of the patient's history were reviewed and updated as appropriate: allergies, current medications, past family history, past medical history, past social history, past surgical history and problem list.     Past Medical History:   Diagnosis Date    Acute non-recurrent pansinusitis 12/17/2018    Arthritis     Asthma     Chronic bilateral low back pain without sciatica 7/28/2015    Takes daily acetaminophen, e.g. 500 mg 2 - 3X/day    Concussion 12/16/2013    Contact dermatitis 7/17/2018    COPD (chronic obstructive pulmonary disease) (HCC)     Depression     Erectile dysfunction 2/20/2017    Hiatal hernia     Hyperlipidemia     Neck pain 12/19/2013    Open wound of scalp 12/16/2013    Pain in joint of right wrist 12/16/2013    Palpitations 9/4/2012    Procedure/Onset: 02/15/2008    Postconcussion syndrome 12/19/2013    Sinus trouble     Ventilation pneumonitis (HCC) 12/16/2013       Past Surgical History:   Procedure Laterality Date    HERNIA REPAIR         Family History   Problem Relation Age of Onset    Hypertension Father     Skin cancer Sister     Hydrocephalus  Sister     Diabetes Sister     Hyperlipidemia Sister     Obesity Sister     Skin cancer Brother     Glaucoma Brother     Arthritis Family     Stroke Mother     No Known Problems Son     Hypertension Sister          Medications have been verified.        Objective   /62   Pulse 101   Temp 98.3 °F (36.8 °C) (Tympanic)   Resp 16   Wt 96.2 kg (212 lb)   SpO2 94%   BMI 30.42 kg/m²        Physical Exam     Physical Exam  Vitals and nursing note reviewed.   Constitutional:       General: He is not in acute distress.     Appearance: Normal appearance. He is not ill-appearing.   HENT:      Head: Normocephalic and atraumatic.   Cardiovascular:      Rate and Rhythm: Normal rate.   Pulmonary:      Effort: Pulmonary effort is normal. No respiratory distress.   Musculoskeletal:      Right wrist: Normal.      Right hand: Swelling and tenderness (2nd/3rd metacarpal) present. Normal range of motion. Normal strength. Normal sensation. Normal capillary refill. Normal pulse.        Hands:    Skin:     General: Skin is warm and dry.      Capillary Refill: Capillary refill takes less than 2 seconds.   Neurological:      Mental Status: He is alert and oriented to person, place, and time.   Psychiatric:         Behavior: Behavior normal.

## 2025-05-08 ENCOUNTER — CONSULT (OUTPATIENT)
Dept: PULMONOLOGY | Facility: MEDICAL CENTER | Age: 75
End: 2025-05-08
Payer: COMMERCIAL

## 2025-05-08 VITALS
BODY MASS INDEX: 30.06 KG/M2 | HEART RATE: 83 BPM | TEMPERATURE: 98 F | SYSTOLIC BLOOD PRESSURE: 118 MMHG | RESPIRATION RATE: 12 BRPM | DIASTOLIC BLOOD PRESSURE: 80 MMHG | WEIGHT: 210 LBS | HEIGHT: 70 IN | OXYGEN SATURATION: 97 %

## 2025-05-08 DIAGNOSIS — R06.09 DYSPNEA ON EXERTION: Primary | ICD-10-CM

## 2025-05-08 DIAGNOSIS — J45.20 MILD INTERMITTENT ASTHMA WITHOUT COMPLICATION: ICD-10-CM

## 2025-05-08 DIAGNOSIS — R05.3 CHRONIC COUGH: ICD-10-CM

## 2025-05-08 DIAGNOSIS — J30.9 ALLERGIC RHINITIS, UNSPECIFIED SEASONALITY, UNSPECIFIED TRIGGER: ICD-10-CM

## 2025-05-08 PROCEDURE — 95012 NITRIC OXIDE EXP GAS DETER: CPT | Performed by: INTERNAL MEDICINE

## 2025-05-08 PROCEDURE — 99204 OFFICE O/P NEW MOD 45 MIN: CPT | Performed by: INTERNAL MEDICINE

## 2025-05-08 PROCEDURE — 94010 BREATHING CAPACITY TEST: CPT | Performed by: INTERNAL MEDICINE

## 2025-05-08 RX ORDER — ALBUTEROL SULFATE 90 UG/1
2 INHALANT RESPIRATORY (INHALATION) EVERY 6 HOURS PRN
Qty: 18 G | Refills: 5 | Status: SHIPPED | OUTPATIENT
Start: 2025-05-08

## 2025-05-08 RX ORDER — FLUTICASONE PROPIONATE 50 MCG
SPRAY, SUSPENSION (ML) NASAL
Qty: 16 G | Refills: 5 | Status: SHIPPED | OUTPATIENT
Start: 2025-05-08

## 2025-05-08 RX ORDER — FLUTICASONE PROPIONATE 50 MCG
SPRAY, SUSPENSION (ML) NASAL
Qty: 16 G | Refills: 5 | Status: SHIPPED | OUTPATIENT
Start: 2025-05-08 | End: 2025-05-08

## 2025-05-08 NOTE — PATIENT INSTRUCTIONS
Next time you plan on doing some exertional activity take 2 puffs of your albuterol inhaler before hand    Schedule echocardiogram to evaluate your heart muscle and heart valves    Next time you need refill on your amlodipine 5 mg tablet asked Dr. Smith to just change it to 10 mg instead of taking two 5 mg tablets    When you use your Ventolin inhaler for first time waste 2 puffs you have to prime it    If you ever want a blood test for allergies I can place order    Try using Flonase nasal spray 2 sprays at bedtime for your postnasal drainage.  Can even try over-the-counter Claritin or other antihistamine as well
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
## 2025-05-09 PROBLEM — R05.3 CHRONIC COUGH: Status: ACTIVE | Noted: 2025-05-09

## 2025-05-09 PROBLEM — R06.09 DYSPNEA ON EXERTION: Status: ACTIVE | Noted: 2025-05-09

## 2025-05-09 NOTE — ASSESSMENT & PLAN NOTE
Over the past several years she has been having some exertional shortness of breath but with more strenuous activity.  Does not have any associated wheezing or chest pain.  I did order an echocardiogram to assess for any possibly cardiomyopathy or valvular heart disease.  Spirometry today shows normal lung volumes.  No significant oxygen desaturation walking up and down flight of stairs.  Lowest O2 saturation 94%    Orders:    Echo complete w/ contrast if indicated; Future

## 2025-05-09 NOTE — ASSESSMENT & PLAN NOTE
He does have history of borderline mild intermittent asthma.  Does sometimes feel like albuterol inhaler helps some.  I did tell him he could try using 2 puffs before more strenuous activity.  Exhaled nitric oxide level today was normal at 19 ppb.  Does not appear to need any type of maintenance inhaler.      Orders:    albuterol (Ventolin HFA) 90 mcg/act inhaler; Inhale 2 puffs every 6 (six) hours as needed for wheezing or shortness of breath

## 2025-05-09 NOTE — ASSESSMENT & PLAN NOTE
He does have some chronic intermittent cough which I suspect is primarily due to postnasal drainage.  He does get nasal congestion postnasal drainage.  I did recommend he try Flonase nasal spray 2 sprays at bedtime as needed.  I also offered to order serum [place] allergy panel but he deferred at this time.    Orders:    Ambulatory Referral to Pulmonology    POCT spirometry    POCT FeNO

## 2025-06-24 DIAGNOSIS — F41.1 GENERALIZED ANXIETY DISORDER: ICD-10-CM

## 2025-06-25 RX ORDER — VENLAFAXINE HYDROCHLORIDE 150 MG/1
150 CAPSULE, EXTENDED RELEASE ORAL DAILY
Qty: 30 CAPSULE | Refills: 11 | Status: SHIPPED | OUTPATIENT
Start: 2025-06-25

## 2025-07-21 ENCOUNTER — TELEPHONE (OUTPATIENT)
Age: 75
End: 2025-07-21

## 2025-07-28 DIAGNOSIS — E78.2 MIXED HYPERLIPIDEMIA: ICD-10-CM

## 2025-07-29 RX ORDER — ATORVASTATIN CALCIUM 40 MG/1
40 TABLET, FILM COATED ORAL DAILY
Qty: 100 TABLET | Refills: 2 | Status: SHIPPED | OUTPATIENT
Start: 2025-07-29